# Patient Record
Sex: MALE | Race: WHITE | Employment: OTHER | RURAL
[De-identification: names, ages, dates, MRNs, and addresses within clinical notes are randomized per-mention and may not be internally consistent; named-entity substitution may affect disease eponyms.]

---

## 2017-01-06 ENCOUNTER — TELEPHONE (OUTPATIENT)
Dept: CARDIOLOGY CLINIC | Age: 72
End: 2017-01-06

## 2017-01-06 DIAGNOSIS — I65.23 CAROTID STENOSIS, BILATERAL: ICD-10-CM

## 2017-01-06 NOTE — TELEPHONE ENCOUNTER
----- Message from Tawanda Urbano MD sent at 1/6/2017  2:16 PM EST -----  Regarding: carotid dopplers  Advise carotid dopplers UNCHANGED from previous 50-69% L ICA and 16-49% Right ICA.   Thanks [facility]

## 2017-01-06 NOTE — TELEPHONE ENCOUNTER
----- Message from Deandra Naik MD sent at 1/6/2017 12:47 PM EST -----  Regarding: echo  Call/advise echo UNCHANGED from previous--normal LV pumping function, mild aortic valve calcium, no stenosis, aorta 4.5 cm--no change.   Thanks [facility]

## 2017-01-10 DIAGNOSIS — I77.810 DILATED AORTIC ROOT (HCC): ICD-10-CM

## 2017-01-10 DIAGNOSIS — I48.0 PAROXYSMAL ATRIAL FIBRILLATION (HCC): ICD-10-CM

## 2017-01-10 NOTE — TELEPHONE ENCOUNTER
Spoke with the pt. Verified patient with two patient identifiers. Echo & carotid doppler results given. Questions answered.

## 2017-06-19 RX ORDER — RIVAROXABAN 20 MG/1
TABLET, FILM COATED ORAL
Qty: 90 TAB | Refills: 2 | Status: SHIPPED | OUTPATIENT
Start: 2017-06-19 | End: 2018-03-14 | Stop reason: SDUPTHER

## 2017-06-21 ENCOUNTER — OFFICE VISIT (OUTPATIENT)
Dept: CARDIOLOGY CLINIC | Age: 72
End: 2017-06-21

## 2017-06-21 VITALS
HEART RATE: 48 BPM | BODY MASS INDEX: 19.88 KG/M2 | SYSTOLIC BLOOD PRESSURE: 90 MMHG | WEIGHT: 150 LBS | OXYGEN SATURATION: 98 % | RESPIRATION RATE: 16 BRPM | DIASTOLIC BLOOD PRESSURE: 60 MMHG | HEIGHT: 73 IN

## 2017-06-21 DIAGNOSIS — I77.810 DILATED AORTIC ROOT (HCC): ICD-10-CM

## 2017-06-21 DIAGNOSIS — R00.1 SINUS BRADYCARDIA: Primary | ICD-10-CM

## 2017-06-21 DIAGNOSIS — I65.23 CAROTID STENOSIS, BILATERAL: ICD-10-CM

## 2017-06-21 DIAGNOSIS — I48.0 PAROXYSMAL ATRIAL FIBRILLATION (HCC): ICD-10-CM

## 2017-06-21 NOTE — MR AVS SNAPSHOT
Visit Information Date & Time Provider Department Dept. Phone Encounter #  
 6/21/2017 11:20 AM Oren Vaca MD [facility] [phone] [id number] Follow-up Instructions Return in about 6 months (around 12/21/2017). Follow-up and Disposition History Your Appointments 1/23/2018  3:00 PM  
ESTABLISHED PATIENT with Oren Vaca MD  
[address] [facility] [address] [facility]) Appt Note: 6 mth fu  $0 cp  
 [address] [id number] [phone]  
  
   
 [facility] [address] [address] Upcoming Health Maintenance Date Due Hepatitis C Screening 1945 DTaP/Tdap/Td series (1 - Tdap) 11/17/1968 FOBT Q 1 YEAR AGE 50-75 11/17/1997 ZOSTER VACCINE AGE 60> 11/17/2007 GLAUCOMA SCREENING Q2Y 11/17/2012 Pneumococcal 65+ Low/Medium Risk (1 of 2 - PCV13) 11/17/2012 MEDICARE YEARLY EXAM 11/17/2012 INFLUENZA AGE 9 TO ADULT 8/1/2017 Allergies as of 6/21/2017  Review Complete On: 6/21/2017 By: Oren Vaca MD  
 No Known Allergies Current Immunizations  Never Reviewed No immunizations on file. Not reviewed this visit You Were Diagnosed With   
  
 Codes Comments Sinus bradycardia    -  Primary ICD-10-CM: R00.1 ICD-9-CM: 427.89 Paroxysmal atrial fibrillation (HCC)     ICD-10-CM: I48.0 ICD-9-CM: 427.31 Dilated aortic root (HCC)     ICD-10-CM: X43.477 ICD-9-CM: 447.71 Carotid stenosis, bilateral     ICD-10-CM: I65.23 ICD-9-CM: 433.10, 433.30 Vitals BP Pulse Resp Height(growth percentile) Weight(growth percentile) SpO2  
 90/60 (BP 1 Location: Left arm, BP Patient Position: Sitting) (!) 48 16 6' 1\" (1.854 m) 150 lb (68 kg) 98% BMI Smoking Status 19.79 kg/m2 Former Smoker Vitals History BMI and BSA Data Body Mass Index Body Surface Area  19.79 kg/m 2 1.87 m 2  
  
  
 Preferred Pharmacy Pharmacy Name Phone [address] [facility] [phone] Your Updated Medication List  
  
   
This list is accurate as of: 6/21/17 11:58 AM.  Always use your most recent med list.  
  
  
  
  
 aspirin delayed-release 81 mg tablet Take  by mouth daily. atenolol 25 mg tablet Commonly known as:  TENORMIN Take 0.5 Tabs by mouth nightly. FLONASE 50 mcg/actuation nasal spray Generic drug:  fluticasone  
by Nasal route as needed. magnesium oxide 400 mg tablet Commonly known as:  MAG-OX Take 400 mg by mouth daily. METHIMAZOLE PO Take 2.5 mg by mouth every seven (7) days. VITAMIN B-12 1,000 mcg tablet Generic drug:  cyanocobalamin Take 1,000 mcg by mouth daily. VITAMIN D3 1,000 unit Cap Generic drug:  cholecalciferol Take 2,000 Units by mouth. XARELTO 20 mg Tab tablet Generic drug:  rivaroxaban TAKE 1 TABLET DAILY WITH DINNER We Performed the Following AMB POC EKG ROUTINE W/ 12 LEADS, INTER & REP [10870 CPT(R)] Follow-up Instructions Return in about 6 months (around 12/21/2017). Introducing [facility] & HEALTH SERVICES! [place] introduces [employer] patient portal. Now you can access parts of your medical record, email your doctor's office, and request medication refills online. 1. In your internet browser, go to [email or web address] 2. Click on the First Time User? Click Here link in the Sign In box. You will see the New Member Sign Up page. 3. Enter your [employer] Access Code exactly as it appears below. You will not need to use this code after youve completed the sign-up process. If you do not sign up before the expiration date, you must request a new code. · [employer] Access Code: [email or web address] Expires: 9/19/2017 11:58 AM 
 
4.  Enter the last four digits of your Social Security Number (xxxx) and Date of Birth (mm/dd/yyyy) as indicated and click Submit. You will be taken to the next sign-up page. 5. Create a [employer] ID. This will be your [employer] login ID and cannot be changed, so think of one that is secure and easy to remember. 6. Create a [employer] password. You can change your password at any time. 7. Enter your Password Reset Question and Answer. This can be used at a later time if you forget your password. 8. Enter your e-mail address. You will receive e-mail notification when new information is available in [address] 9. Click Sign Up. You can now view and download portions of your medical record. 10. Click the Download Summary menu link to download a portable copy of your medical information. If you have questions, please visit the Frequently Asked Questions section of the [employer] website. Remember, [employer] is NOT to be used for urgent needs. For medical emergencies, dial 911. Now available from your iPhone and Android! Please provide this summary of care documentation to your next provider. Your primary care clinician is listed as Angel Severin. If you have any questions after today's visit, please call [phone].

## 2017-06-21 NOTE — PROGRESS NOTES
Nataliia Leon is a 73 y.o. male is here for routine f/u. No CV sx or complaints. Continues to eat plant-based diet/vegan, physically active, doing well. Lipids/labs followed by PCP. Last Echo 1/17 \"stable\". No PAF episodes. The patient denies chest pain/ shortness of breath, orthopnea, PND, LE edema, palpitations, syncope, presyncope or fatigue. 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 Social History     Social History    Marital status:      Spouse name: N/A    Number of children: N/A    Years of education: N/A     Occupational History    Not on file. Social History Main Topics    Smoking status: Former Smoker     Types: Cigarettes     Quit date: 6/5/1970    Smokeless tobacco: Not on file    Alcohol use Not on file    Drug use: Not on file    Sexual activity: Not on file     Other Topics Concern    Not on file     Social History Narrative      Current Outpatient Prescriptions   Medication Sig    XARELTO 20 mg tab tablet TAKE 1 TABLET DAILY WITH DINNER    atenolol (TENORMIN) 25 mg tablet Take 0.5 Tabs by mouth nightly.  magnesium oxide (MAG-OX) 400 mg tablet Take 400 mg by mouth daily.  cyanocobalamin (VITAMIN B-12) 1,000 mcg tablet Take 1,000 mcg by mouth daily.  Cholecalciferol, Vitamin D3, (VITAMIN D3) 1,000 unit cap Take 2,000 Units by mouth.  aspirin delayed-release 81 mg tablet Take  by mouth daily.     METHIMAZOLE PO Take 2.5 mg by mouth every seven (7) days.  fluticasone (FLONASE) 50 mcg/Actuation nasal spray by Nasal route as needed. No current facility-administered medications for this visit. Review of Symptoms:    CONST  No weight change. No fever, chills, sweats    ENT No visual changes, URI sx, sore throat    CV  See HPI   RESP  No cough, or sputum, wheezing. Also see HPI   GI  No abdominal pain or change in bowel habits. No heartburn or dysphagia. No melena or rectal bleeding.   No dysuria, urgency, frequency, hematuria   MSKEL  No joint pain, swelling. No muscle pain. SKIN  No rash or lesions. NEURO  No headache, syncope, or seizure. No weakness, loss of sensation, or paresthesias. PSYCH  No low mood or depression  No anxiety. HE/LYMPH  No easy bruising, abnormal bleeding, or enlarged glands. Physical ExamPhysical Exam:    Visit Vitals    BP 90/60 (BP 1 Location: Left arm, BP Patient Position: Sitting)    Pulse (!) 48    Resp 16    Ht 6' 1\" (1.854 m)    Wt 150 lb (68 kg)    SpO2 98%    BMI 19.79 kg/m2     Gen: NAD  HEENT:  PERRL, throat clear  Neck: no mass or thyromegaly, no JVD   Heart:  Regular,Nl S1S2,  no murmur, gallop or rub.   Lungs:  clear  Abdomen:   Soft, non-tender, bowel sounds are active.   Extremities:  No edema  Pulse: symmetric  Neuro: A&O times 3, WNL      Cardiographics    ECG: sinus julissa, no acute changes      Labs:   No results found for: NA, K, CL, CO2, AGAP, GLU, BUN, CREA, BUCR, GFRAA, GFRNA, CA, TBIL, TBILI, GPT, SGOT, AP, TP, ALB, GLOB, AGRAT, ALT  No results found for: CPK, CPKX, CPX  Lab Results   Component Value Date/Time    Cholesterol, total 215 09/24/2012 07:40 AM    HDL Cholesterol 42 09/24/2012 07:40 AM    LDL, calculated 152 09/24/2012 07:40 AM    Triglyceride 107 09/24/2012 07:40 AM     No results found for this or any previous visit.     Assessment:         Patient Active Problem List    Diagnosis Date Noted    Dilated aortic [address] [address] 12/14/2016    Sinus bradycardia 12/14/2016    Carotid stenosis, bilateral     Paroxysmal atrial fibrillation (HCC)     Hyperthyroidism      No CV sx or complaints. Continues to eat plant-based diet/vegan, physically active, doing well. Lipids/labs followed by PCP. Last Echo 1/17 \"stable\". No PAF episodes. Plan:     Doing well with no adverse cardiac symptoms. Lipids and labs followed by PCP. Continue current care and f/u in 6 months.     Dave Alvarado MD

## 2017-08-07 RX ORDER — ATENOLOL 25 MG/1
TABLET ORAL
Qty: 90 TAB | Refills: 1 | Status: SHIPPED | OUTPATIENT
Start: 2017-08-07 | End: 2018-08-14 | Stop reason: SDUPTHER

## 2017-09-27 ENCOUNTER — TELEPHONE (OUTPATIENT)
Dept: CARDIOLOGY CLINIC | Age: 72
End: 2017-09-27

## 2017-09-27 NOTE — TELEPHONE ENCOUNTER
Pt notified (after 2 identifiers) per Dr Rosey Blanchard \"Would be better off to take tylenol (is on Xarelto and ASA 81)--the ibuprofen might increase risk of bleeding\". Pt acknowledged understanding and had no further questions.

## 2017-09-27 NOTE — TELEPHONE ENCOUNTER
Pt states he \"crushed his finger\" day or two ago, and had a root canal yesterday. Inquiring if he can take Ibuprofen with his current medications. Requesting c/b.

## 2018-02-09 ENCOUNTER — OFFICE VISIT (OUTPATIENT)
Dept: CARDIOLOGY CLINIC | Age: 73
End: 2018-02-09

## 2018-02-09 VITALS
BODY MASS INDEX: 20.52 KG/M2 | RESPIRATION RATE: 16 BRPM | OXYGEN SATURATION: 94 % | HEART RATE: 71 BPM | SYSTOLIC BLOOD PRESSURE: 132 MMHG | HEIGHT: 73 IN | DIASTOLIC BLOOD PRESSURE: 68 MMHG | WEIGHT: 154.8 LBS

## 2018-02-09 DIAGNOSIS — R00.1 SINUS BRADYCARDIA: ICD-10-CM

## 2018-02-09 DIAGNOSIS — I48.0 PAROXYSMAL ATRIAL FIBRILLATION (HCC): Primary | ICD-10-CM

## 2018-02-09 DIAGNOSIS — I65.23 CAROTID STENOSIS, BILATERAL: ICD-10-CM

## 2018-02-09 DIAGNOSIS — I77.810 DILATED AORTIC ROOT (HCC): ICD-10-CM

## 2018-02-09 NOTE — MR AVS SNAPSHOT
[address] [id number] [phone] Patient: Sherlie Litten MRN: [id number] OG Visit Information Date & Time Provider Department Dept. Phone Encounter #  
 2018 11:20 AM Shine Burgess MD [facility] [phone] [id number] Follow-up Instructions Return in about 6 months (around 2018). Follow-up and Disposition History Upcoming Health Maintenance Date Due Hepatitis C Screening 1945 DTaP/Tdap/Td series (1 - Tdap) 1968 FOBT Q 1 YEAR AGE 50-75 1997 ZOSTER VACCINE AGE 60> 2007 GLAUCOMA SCREENING Q2Y 2012 Pneumococcal 65+ Low/Medium Risk (1 of 2 - PCV13) 2012 MEDICARE YEARLY EXAM 2012 Influenza Age 5 to Adult 2017 Allergies as of 2018  Review Complete On: 2018 By: Rosanna Broussard LPN No Known Allergies Current Immunizations  Never Reviewed No immunizations on file. Not reviewed this visit You Were Diagnosed With   
  
 Codes Comments Paroxysmal atrial fibrillation (HCC)    -  Primary ICD-10-CM: I48.0 ICD-9-CM: 427.31 Sinus bradycardia     ICD-10-CM: R00.1 ICD-9-CM: 427.89 Dilated aortic root (HCC)     ICD-10-CM: C05.334 ICD-9-CM: 447.71 Carotid stenosis, bilateral     ICD-10-CM: I65.23 ICD-9-CM: 433.10, 433.30 Vitals BP Pulse Resp Height(growth percentile) Weight(growth percentile) SpO2  
 132/68 (BP 1 Location: Left arm, BP Patient Position: Sitting) 71 16 6' 1\" (1.854 m) 154 lb 12.8 oz (70.2 kg) 94% BMI Smoking Status 20.42 kg/m2 Former Smoker Vitals History BMI and BSA Data Body Mass Index Body Surface Area  
 20.42 kg/m 2 1.9 m 2 Preferred Pharmacy Pharmacy Name Phone [address] [facility] [phone] Your Updated Medication List  
  
   
This list is accurate as of: 2/9/18 11:53 AM.  Always use your most recent med list.  
  
  
  
  
 aspirin delayed-release 81 mg tablet Take  by mouth daily. atenolol 25 mg tablet Commonly known as:  TENORMIN  
TAKE ONE-HALF (1/2) TABLET NIGHTLY FLONASE 50 mcg/actuation nasal spray Generic drug:  fluticasone  
by Nasal route as needed. magnesium oxide 400 mg tablet Commonly known as:  MAG-OX Take 400 mg by mouth daily. METHIMAZOLE PO Take 2.5 mg by mouth every seven (7) days. VITAMIN B-12 1,000 mcg tablet Generic drug:  cyanocobalamin Take 1,000 mcg by mouth daily. VITAMIN D3 1,000 unit Cap Generic drug:  cholecalciferol Take 2,000 Units by mouth. XARELTO 20 mg Tab tablet Generic drug:  rivaroxaban TAKE 1 TABLET DAILY WITH DINNER We Performed the Following AMB POC EKG ROUTINE W/ 12 LEADS, INTER & REP [51004 CPT(R)] Follow-up Instructions Return in about 6 months (around 8/9/2018). Introducing \[facility]\"" & HEALTH SERVICES! Tessa Mcclain introduces [employer] patient portal. Now you can access parts of your medical record, email your doctor's office, and request medication refills online. 1. In your internet browser, go to [email or web address] 2. Click on the First Time User? Click Here link in the Sign In box. You will see the New Member Sign Up page. 3. Enter your [employer] Access Code exactly as it appears below. You will not need to use this code after youve completed the sign-up process. If you do not sign up before the expiration date, you must request a new code. · [employer] Access Code: [email or web address] Expires: 5/10/2018 11:53 AM 
 
4. Enter the last four digits of your Social Security Number (xxxx) and Date of Birth (mm/dd/yyyy) as indicated and click Submit. You will be taken to the next sign-up page. 5. Create a [email or web address] ID. This will be your [email or web address] login ID and cannot be changed, so think of one that is secure and easy to remember. 6. Create a [email or web address] password. You can change your password at any time. 7. Enter your Password Reset Question and Answer. This can be used at a later time if you forget your password. 8. Enter your e-mail address. You will receive e-mail notification when new information is available in [address] 9. Click Sign Up. You can now view and download portions of your medical record. 10. Click the Download Summary menu link to download a portable copy of your medical information. If you have questions, please visit the Frequently Asked Questions section of the [email or web address] website. Remember, [email or web address] is NOT to be used for urgent needs. For medical emergencies, dial 911. Now available from your iPhone and Android! Please provide this summary of care documentation to your next provider. Your primary care clinician is listed as Sandie American. If you have any questions after today's visit, please call [phone].

## 2018-02-09 NOTE — PROGRESS NOTES
True Lizz is a 81 y.o. male is here for routine f/u. No CV sx or complaints. Continues to eat plant-based diet/vegan, physically active, doing well. Lipids/labs followed by PCP. Last Echo 1/17 \"stable\". No PAF episodes. The patient denies chest pain/ shortness of breath, orthopnea, PND, LE edema, palpitations, syncope, presyncope or fatigue. 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 Social History Main Topics    Smoking status: Former Smoker     Types: Cigarettes     Quit date: 6/5/1970    Smokeless tobacco: Never Used    Alcohol use Not on file    Drug use: Not on file    Sexual activity: Not on file     Other Topics Concern    Not on file     Social History Narrative      Current Outpatient Prescriptions   Medication Sig    atenolol (TENORMIN) 25 mg tablet TAKE ONE-HALF (1/2) TABLET NIGHTLY    XARELTO 20 mg tab tablet TAKE 1 TABLET DAILY WITH DINNER    magnesium oxide (MAG-OX) 400 mg tablet Take 400 mg by mouth daily.  cyanocobalamin (VITAMIN B-12) 1,000 mcg tablet Take 1,000 mcg by mouth daily.     Cholecalciferol, Vitamin D3, (VITAMIN D3) 1,000 unit cap Take 2,000 Units by mouth.    aspirin delayed-release 81 mg tablet Take  by mouth daily.  fluticasone (FLONASE) 50 mcg/Actuation nasal spray by Nasal route as needed.  METHIMAZOLE PO Take 2.5 mg by mouth every seven (7) days. No current facility-administered medications for this visit. Review of Symptoms:    CONST  No weight change. No fever, chills, sweats    ENT No visual changes, URI sx, sore throat    CV  See HPI   RESP  No cough, or sputum, wheezing. Also see HPI   GI  No abdominal pain or change in bowel habits. No heartburn or dysphagia. No melena or rectal bleeding.   No dysuria, urgency, frequency, hematuria   MSKEL  No joint pain, swelling. No muscle pain. SKIN  No rash or lesions. NEURO  No headache, syncope, or seizure. No weakness, loss of sensation, or paresthesias. PSYCH  No low mood or depression  No anxiety. HE/LYMPH  No easy bruising, abnormal bleeding, or enlarged glands. Physical ExamPhysical Exam:    Visit Vitals    /68 (BP 1 Location: Left arm, BP Patient Position: Sitting)    Pulse 71    Resp 16    Ht 6' 1\" (1.854 m)    Wt 154 lb 12.8 oz (70.2 kg)    SpO2 94%    BMI 20.42 kg/m2     Gen: NAD  HEENT:  PERRL, throat clear  Neck: no adenopathy, no thyromegaly, no JVD   Heart:  Regular,Nl R6P8,  II/VI diastolic murmur, no gallop or rub.   Lungs:  clear  Abdomen:   Soft, non-tender, bowel sounds are active.   Extremities:  No edema  Pulse: symmetric  Neuro: A&O times 3, No focal neuro deficits    Cardiographics    ECG: sinus julissa, no acute changes      Lab Results   Component Value Date/Time    Cholesterol, total 215 (H) 09/24/2012 07:40 AM    HDL Cholesterol 42 09/24/2012 07:40 AM    LDL, calculated 152 (H) 09/24/2012 07:40 AM    Triglyceride 107 09/24/2012 07:40 AM     No results found for this or any previous visit.     Assessment:         Patient Active Problem List    Diagnosis Date Noted    Dilated aortic root ([address] 12/14/2016    Sinus bradycardia 12/14/2016    Carotid stenosis, bilateral     Paroxysmal atrial fibrillation (HCC)     Hyperthyroidism         Plan:     Doing well with no adverse cardiac symptoms. Lipids and labs followed by PCP. Continue current care and f/u in 6 months.     Woody Hawk MD

## 2018-02-09 NOTE — PROGRESS NOTES
Chief Complaint   Patient presents with    Irregular Heart Beat     6 month follow up     1. Have you been to the ER, urgent care clinic since your last visit? Hospitalized since your last visit? No    2. Have you seen or consulted any other health care providers outside of the [address] [place] [address] since your last visit? Include any pap smears or colon screening.  No

## 2018-03-14 RX ORDER — RIVAROXABAN 20 MG/1
TABLET, FILM COATED ORAL
Qty: 90 TAB | Refills: 2 | Status: SHIPPED | OUTPATIENT
Start: 2018-03-14 | End: 2018-11-10 | Stop reason: SDUPTHER

## 2018-08-13 ENCOUNTER — OFFICE VISIT (OUTPATIENT)
Dept: CARDIOLOGY CLINIC | Age: 73
End: 2018-08-13

## 2018-08-13 VITALS
WEIGHT: 148 LBS | OXYGEN SATURATION: 98 % | HEART RATE: 49 BPM | SYSTOLIC BLOOD PRESSURE: 118 MMHG | DIASTOLIC BLOOD PRESSURE: 76 MMHG | BODY MASS INDEX: 19.61 KG/M2 | RESPIRATION RATE: 18 BRPM | HEIGHT: 73 IN

## 2018-08-13 DIAGNOSIS — R00.1 SINUS BRADYCARDIA: ICD-10-CM

## 2018-08-13 DIAGNOSIS — I65.23 CAROTID STENOSIS, BILATERAL: ICD-10-CM

## 2018-08-13 DIAGNOSIS — I77.810 DILATED AORTIC ROOT (HCC): ICD-10-CM

## 2018-08-13 DIAGNOSIS — E05.90 HYPERTHYROIDISM: ICD-10-CM

## 2018-08-13 DIAGNOSIS — I48.0 PAROXYSMAL ATRIAL FIBRILLATION (HCC): Primary | ICD-10-CM

## 2018-08-13 NOTE — PROGRESS NOTES
Verified patient with two patient identifiers. Medications reviewed/approved by Dr. Sunny Queen. Verbal from Dr. Sunny Queen to remove the medications that were deleted during the visit. Medication(s) removed: magnesium 400 mg    Chief Complaint   Patient presents with    Irregular Heart Beat     6 month follow up    Slow Heart Rate    Carotid Artery Stenosis       1. Have you been to the ER, urgent care clinic since your last visit? Hospitalized since your last visit? no    2. Have you seen or consulted any other health care providers outside of the [address] [place] [address] since your last visit? Include any pap smears or colon screening. Yes, Dr. Raymundo Garzon - pcp - routine care.

## 2018-08-13 NOTE — MR AVS SNAPSHOT
[address] [place] [address] 
 
 
 [address] [address] [phone] Patient: Darrell Jaramillo MRN: [id number] Visit Information Date & Time Provider Department Dept. Phone Encounter #  
 8/13/2018 10:40 AM Abdias Larsen, [address] [place] [address] Cardiology [facility]  Follow-up Instructions Return in about 6 months (around 2/13/2019). Follow-up and Disposition History Upcoming Health Maintenance Date Due Hepatitis C Screening 1945 DTaP/Tdap/Td series (1 - Tdap) 11/17/1968 FOBT Q 1 YEAR AGE 50-75 11/17/1997 ZOSTER VACCINE AGE 60> 9/17/2007 GLAUCOMA SCREENING Q2Y 11/17/2012 Pneumococcal 65+ Low/Medium Risk (1 of 2 - PCV13) 11/17/2012 MEDICARE YEARLY EXAM 3/14/2018 Influenza Age 5 to Adult 8/1/2018 Allergies as of 8/13/2018  Review Complete On: 8/13/2018 By: Abdias Larsen MD  
 No Known Allergies Current Immunizations  Never Reviewed No immunizations on file. Not reviewed this visit You Were Diagnosed With   
  
 Codes Comments Paroxysmal atrial fibrillation (HCC)    -  Primary ICD-10-CM: I48.0 ICD-9-CM: 427.31 Sinus bradycardia     ICD-10-CM: R00.1 ICD-9-CM: 427.89 Carotid stenosis, bilateral     ICD-10-CM: I65.23 ICD-9-CM: 433.10, 433.30 Dilated aortic root (HCC)     ICD-10-CM: F61.069 ICD-9-CM: 447.71 Hyperthyroidism     ICD-10-CM: E05.90 ICD-9-CM: 242.90 Vitals BP Pulse Resp Height(growth percentile) Weight(growth percentile) SpO2  
 118/76 (BP 1 Location: Left arm, BP Patient Position: Sitting) (!) 49 18 6' 1\" (1.854 m) 148 lb (67.1 kg) 98% BMI Smoking Status 19.53 kg/m2 Former Smoker Vitals History BMI and BSA Data Body Mass Index Body Surface Area  
 19.53 kg/m 2 1.86 m 2 Preferred Pharmacy Pharmacy Name Phone [facility], [facility] [phone] Your Updated Medication List  
  
   
This list is accurate as of 8/13/18 11:33 AM.  Always use your most recent med list.  
  
  
  
  
 aspirin delayed-release 81 mg tablet Take  by mouth daily. atenolol 25 mg tablet Commonly known as:  TENORMIN  
TAKE ONE-HALF (1/2) TABLET NIGHTLY CALCIUM-MAGNESIUM-ZINC PO Take  by mouth. FLONASE 50 mcg/actuation nasal spray Generic drug:  fluticasone  
by Nasal route as needed. METHIMAZOLE PO Take 2.5 mg by mouth every seven (7) days. VITAMIN B-12 1,000 mcg tablet Generic drug:  cyanocobalamin Take 1,000 mcg by mouth daily. VITAMIN D3 1,000 unit Cap Generic drug:  cholecalciferol Take 2,000 Units by mouth. XARELTO 20 mg Tab tablet Generic drug:  rivaroxaban TAKE 1 TABLET DAILY WITH DINNER We Performed the Following AMB POC EKG ROUTINE W/ 12 LEADS, INTER & REP [86504 CPT(R)] Follow-up Instructions Return in about 6 months (around 2/13/2019). To-Do List   
 Around 01/07/2019 ECHO:  2D ECHO COMPLETE ADULT (TTE) W OR WO CONTR Around 01/07/2019 Imaging:  DUPLEX CAROTID BILATERAL Introducing [facility]! Macario Velasquez introduces [employer] patient portal. Now you can access parts of your medical record, email your doctor's office, and request medication refills online. 1. In your internet browser, go to [email or web address] 2. Click on the First Time User? Click Here link in the Sign In box. You will see the New Member Sign Up page. 3. Enter your [employer] Access Code exactly as it appears below. You will not need to use this code after youve completed the sign-up process. If you do not sign up before the expiration date, you must request a new code. · [employer] Access Code: [email or web address] Expires: 11/11/2018 10:22 AM 
 
 4. Enter the last four digits of your Social Security Number (xxxx) and Date of Birth (mm/dd/yyyy) as indicated and click Submit. You will be taken to the next sign-up page. 5. Create a [employer] ID. This will be your [employer] login ID and cannot be changed, so think of one that is secure and easy to remember. 6. Create a [employer] password. You can change your password at any time. 7. Enter your Password Reset Question and Answer. This can be used at a later time if you forget your password. 8. Enter your e-mail address. You will receive e-mail notification when new information is available in [address] 9. Click Sign Up. You can now view and download portions of your medical record. 10. Click the Download Summary menu link to download a portable copy of your medical information. If you have questions, please visit the Frequently Asked Questions section of the [employer] website. Remember, [employer] is NOT to be used for urgent needs. For medical emergencies, dial 911. Now available from your iPhone and Android! Please provide this summary of care documentation to your next provider. Your primary care clinician is listed as Tom Zavala. If you have any questions after today's visit, please call [phone].

## 2018-08-13 NOTE — PROGRESS NOTES
Margarito Odonnell is a 81 y.o. male is here for routine f/u.  No CV sx or complaints.  Continues to eat plant-based diet/vegan, physically active, doing well. Lipids/labs followed by PCP. Bekah Gilbert Echo 1/17 \"stable\". No PAF episodes. The patient denies chest pain/ shortness of breath, orthopnea, PND, LE edema, palpitations, syncope, presyncope or fatigue. 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 Social History Main Topics    Smoking status: Former Smoker     Types: Cigarettes     Quit date: 6/5/1970    Smokeless tobacco: Never Used    Alcohol use Not on file    Drug use: Not on file    Sexual activity: Not on file     Other Topics Concern    Not on file     Social History Narrative      Current Outpatient Prescriptions   Medication Sig    XARELTO 20 mg tab tablet TAKE 1 TABLET DAILY WITH DINNER    atenolol (TENORMIN) 25 mg tablet TAKE ONE-HALF (1/2) TABLET NIGHTLY    magnesium oxide (MAG-OX) 400 mg tablet Take 400 mg by mouth daily.  cyanocobalamin (VITAMIN B-12) 1,000 mcg tablet Take 1,000 mcg by mouth daily.     Cholecalciferol, Vitamin D3, (VITAMIN D3) 1,000 unit cap Take 2,000 Units by mouth.    aspirin delayed-release 81 mg tablet Take  by mouth daily.  fluticasone (FLONASE) 50 mcg/Actuation nasal spray by Nasal route as needed.  METHIMAZOLE PO Take 2.5 mg by mouth every seven (7) days. No current facility-administered medications for this visit. Review of Symptoms:    CONST  No weight change. No fever, chills, sweats    ENT No visual changes, URI sx, sore throat    CV  See HPI   RESP  No cough, or sputum, wheezing. Also see HPI   GI  No abdominal pain or change in bowel habits. No heartburn or dysphagia. No melena or rectal bleeding.   No dysuria, urgency, frequency, hematuria   MSKEL  No joint pain, swelling. No muscle pain. SKIN  No rash or lesions. NEURO  No headache, syncope, or seizure. No weakness, loss of sensation, or paresthesias. PSYCH  No low mood or depression  No anxiety. HE/LYMPH  No easy bruising, abnormal bleeding, or enlarged glands. Physical ExamPhysical Exam:    Visit Vitals    Pulse (!) 49    Resp 18    Ht 6' 1\" (1.854 m)    SpO2 98%     Gen: NAD  HEENT:  PERRL, throat clear  Neck: no adenopathy, no thyromegaly, no JVD faint bruit L  Heart:  Regular,Nl S1S2,  I-II/VI diastolic murmur, no gallop or rub.   Lungs:  clear  Abdomen:   Soft, non-tender, bowel sounds are active.   Extremities:  No edema  Pulse: symmetric  Neuro: A&O times 3, No focal neuro deficits    Cardiographics    ECG: sinus julissa, wnl      Assessment:         Patient Active Problem List    Diagnosis Date Noted    Dilated aortic root (HCC) 12/14/2016    Sinus bradycardia 12/14/2016    Carotid stenosis, bilateral     Paroxysmal atrial fibrillation (HCC)     Hyperthyroidism      No CV sx or complaints.  Continues to eat plant-based diet/vegan, physically active, doing well. Lipids/labs followed by PCP. Elijah Thomson Echo 1/17 \"stable\". No PAF episodes. Plan:     Doing well with no adverse cardiac symptoms.    Plan repeat Echo/doppler and Carotid dopplers 1/19 (2 yrs)   Lipids and labs followed by PCP. Continue current care and f/u in 6 months.     Edel Shukla MD

## 2018-08-14 RX ORDER — ATENOLOL 25 MG/1
TABLET ORAL
Qty: 90 TAB | Refills: 1 | Status: SHIPPED | OUTPATIENT
Start: 2018-08-14 | End: 2019-01-15

## 2018-08-21 ENCOUNTER — CLINICAL SUPPORT (OUTPATIENT)
Dept: CARDIOLOGY CLINIC | Age: 73
End: 2018-08-21

## 2018-08-21 ENCOUNTER — TELEPHONE (OUTPATIENT)
Dept: CARDIOLOGY CLINIC | Age: 73
End: 2018-08-21

## 2018-08-21 VITALS
OXYGEN SATURATION: 98 % | RESPIRATION RATE: 16 BRPM | HEART RATE: 76 BPM | SYSTOLIC BLOOD PRESSURE: 120 MMHG | DIASTOLIC BLOOD PRESSURE: 78 MMHG

## 2018-08-21 DIAGNOSIS — E05.90 HYPERTHYROIDISM: ICD-10-CM

## 2018-08-21 DIAGNOSIS — I48.0 PAROXYSMAL ATRIAL FIBRILLATION (HCC): Primary | ICD-10-CM

## 2018-08-21 DIAGNOSIS — I77.810 DILATED AORTIC ROOT (HCC): ICD-10-CM

## 2018-08-21 NOTE — PROGRESS NOTES
Andres Garcia is a 81 y.o. male is here for  Symptom-based f/u--afib/palpitations, mild dizziness, decreased energy past 24 hrs w/ irreg heart rhythm. Continues his Xarelto and low dose atenolol qhs. .  The patient denies chest pain/ shortness of breath, orthopnea, PND, LE edema, , syncope, presyncope or fatigue. 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 Social History Main Topics    Smoking status: Former Smoker     Types: Cigarettes     Quit date: 6/5/1970    Smokeless tobacco: Never Used    Alcohol use Not on file    Drug use: Not on file    Sexual activity: Not on file     Other Topics Concern    Not on file     Social History Narrative      Current Outpatient Prescriptions   Medication Sig    atenolol (TENORMIN) 25 mg tablet TAKE ONE-HALF (1/2) TABLET NIGHTLY    CALCIUM-MAGNESIUM-ZINC PO Take  by mouth.  XARELTO 20 mg tab tablet TAKE 1 TABLET DAILY WITH DINNER    cyanocobalamin (VITAMIN B-12) 1,000 mcg tablet Take 1,000 mcg by mouth daily.  Cholecalciferol, Vitamin D3, (VITAMIN D3) 1,000 unit cap Take 2,000 Units by mouth.     aspirin delayed-release 81 mg tablet Take  by mouth daily.    fluticasone (FLONASE) 50 mcg/Actuation nasal spray by Nasal route as needed.  METHIMAZOLE PO Take 2.5 mg by mouth every seven (7) days. No current facility-administered medications for this visit. Review of Symptoms:    CONST  No weight change. No fever, chills, sweats    ENT No visual changes, URI sx, sore throat    CV  See HPI   RESP  No cough, or sputum, wheezing. Also see HPI   GI  No abdominal pain or change in bowel habits. No heartburn or dysphagia. No melena or rectal bleeding.   No dysuria, urgency, frequency, hematuria   MSKEL  No joint pain, swelling. No muscle pain. SKIN  No rash or lesions. NEURO  No headache, syncope, or seizure. No weakness, loss of sensation, or paresthesias. PSYCH  No low mood or depression  No anxiety. HE/LYMPH  No easy bruising, abnormal bleeding, or enlarged glands. Physical ExamPhysical Exam:    Visit Vitals    /78 (BP 1 Location: Left arm, BP Patient Position: Sitting)    Pulse 76    Resp 16    SpO2 98%  Comment: ra     Gen: NAD  HEENT:  PERRL, throat clear  Neck: no adenopathy, no thyromegaly, no JVD   Heart:  irregular,Nl S1S2,  no murmur, gallop or rub.   Lungs:  clear  Abdomen:   Soft, non-tender, bowel sounds are active.   Extremities:  No edema  Pulse: symmetric  Neuro: A&O times 3, No focal neuro deficits    Cardiographics    ECG: afib       Labs:   No results found for: NA, K, CL, CO2, AGAP, GLU, BUN, CREA, BUCR, GFRAA, GFRNA, CA, TBIL, TBILI, GPT, SGOT, AP, TP, ALB, GLOB, AGRAT, ALT  No results found for: CPK, CPKX, CPX  Lab Results   Component Value Date/Time    Cholesterol, total 215 (H) 09/24/2012 07:40 AM    HDL Cholesterol 42 09/24/2012 07:40 AM    LDL, calculated 152 (H) 09/24/2012 07:40 AM    Triglyceride 107 09/24/2012 07:40 AM     No results found for this or any previous visit.     Assessment:         Patient Active Problem List    Diagnosis Date Noted    Dilated aortic root ([facility] [address] 12/14/2016    Sinus bradycardia 12/14/2016    Carotid stenosis, bilateral     Paroxysmal atrial fibrillation (HCC)     Hyperthyroidism      Symptom-based f/u--afib/palpitations, mild dizziness, decreased energy past 24 hrs w/ irreg heart rhythm. Continues his Xarelto and low dose atenolol qhs. .     Plan:     Take extra atenolol dose now, fluids, no strenous activity  Will continue his regular dose of atenolol tonight and Xarelto--call in am tomorrow with update  Possible Holter  Would consider flecainide or rythmol but hold off for now      Colleen Kirk MD

## 2018-08-21 NOTE — TELEPHONE ENCOUNTER
Verified patient with two patient identifiers. Pt reports that he's in afib and his rate is all over the place. Pt does not have any way of taking his VS.  Pt is willing to be brought to the office for me to perform a ekg and obtain VS.     No sx's other than feeling anxious. Pt to arrive around 11:30am this morning.

## 2018-08-21 NOTE — TELEPHONE ENCOUNTER
Pt states began with a-fib last night around 6 pm that has continued. Pt was asked for rate and told that \"it's all over the place\". Requesting c/b.

## 2018-08-21 NOTE — PROGRESS NOTES
Verified patient with two patient identifiers. Vital signs:    /78  HR   O2 sat 98% sat  resp 16    No complaints per the pt other than feeling anxious. Medications reviewed/approved by Dr. Constance Beltran. Chief Complaint   Patient presents with    Irregular Heart Beat     Vital sign check - ekg        1. Have you been to the ER, urgent care clinic since your last visit? Hospitalized since your last visit? no    2. Have you seen or consulted any other health care providers outside of the [address] [place] [address] since your last visit? Include any pap smears or colon screening.  no

## 2018-08-21 NOTE — PATIENT INSTRUCTIONS
Take 1/2 of your atenolol once you get home this afternoon. Take your normal dose of atenolol tonight.   Call first thing tomorrow morning, 8:30am.

## 2018-08-21 NOTE — MR AVS SNAPSHOT
[address] 
 
 
 [address] [facility] [id number] [phone] Patient: Jose Wagner MRN: [id number] Visit Information Date & Time Provider Department Dept. Phone Encounter #  
 8/21/2018 11:40 AM Saravanan Dexter, [address] [facility] Cardiology [facility] [phone] [id number] Your Appointments 2/15/2019 10:20 AM  
ESTABLISHED PATIENT with Saravanan Dexter MD  
[address] [place] [facility] [address] [facility]) Appt Note: 6 mo fu $0cp [address] [facility] [address] [address] [phone]  
  
   
 [address] [place] [address] Upcoming Health Maintenance Date Due Hepatitis C Screening 1945 DTaP/Tdap/Td series (1 - Tdap) 11/17/1968 FOBT Q 1 YEAR AGE 50-75 11/17/1997 ZOSTER VACCINE AGE 60> 9/17/2007 GLAUCOMA SCREENING Q2Y 11/17/2012 Pneumococcal 65+ Low/Medium Risk (1 of 2 - PCV13) 11/17/2012 MEDICARE YEARLY EXAM 3/14/2018 Influenza Age 5 to Adult 8/1/2018 Allergies as of 8/21/2018  Review Complete On: 8/13/2018 By: Saravanan Dexter MD  
 No Known Allergies Current Immunizations  Never Reviewed No immunizations on file. Not reviewed this visit You Were Diagnosed With   
  
 Codes Comments Paroxysmal atrial fibrillation (HCC)    -  Primary ICD-10-CM: I48.0 ICD-9-CM: 427.31 Hyperthyroidism     ICD-10-CM: E05.90 ICD-9-CM: 242.90 Dilated aortic root (HCC)     ICD-10-CM: H42.577 ICD-9-CM: 447.71 Vitals BP Pulse Resp SpO2 Smoking Status 120/78 (BP 1 Location: Left arm, BP Patient Position: Sitting) 76 16 98% Former Smoker Vitals History Preferred Pharmacy Pharmacy Name Phone [facility], [facility] [phone] Your Updated Medication List  
  
   
 This list is accurate as of 8/21/18 12:13 PM.  Always use your most recent med list.  
  
  
  
  
 aspirin delayed-release 81 mg tablet Take  by mouth daily. atenolol 25 mg tablet Commonly known as:  TENORMIN  
TAKE ONE-HALF (1/2) TABLET NIGHTLY CALCIUM-MAGNESIUM-ZINC PO Take  by mouth. FLONASE 50 mcg/actuation nasal spray Generic drug:  fluticasone  
by Nasal route as needed. METHIMAZOLE PO Take 2.5 mg by mouth every seven (7) days. VITAMIN B-12 1,000 mcg tablet Generic drug:  cyanocobalamin Take 1,000 mcg by mouth daily. VITAMIN D3 1,000 unit Cap Generic drug:  cholecalciferol Take 2,000 Units by mouth. XARELTO 20 mg Tab tablet Generic drug:  rivaroxaban TAKE 1 TABLET DAILY WITH DINNER We Performed the Following AMB POC EKG ROUTINE W/ 12 LEADS, INTER & REP [16114 CPT(R)] Patient Instructions Take 1/2 of your atenolol once you get home this afternoon. Take your normal dose of atenolol tonight. Call first thing tomorrow morning, 8:30am. 
 
 
 
 
  
Introducing [facility]! [facility] introduces [employer] patient portal. Now you can access parts of your medical record, email your doctor's office, and request medication refills online. 1. In your internet browser, go to [email or web address] 2. Click on the First Time User? Click Here link in the Sign In box. You will see the New Member Sign Up page. 3. Enter your [employer] Access Code exactly as it appears below. You will not need to use this code after youve completed the sign-up process. If you do not sign up before the expiration date, you must request a new code. · [employer] Access Code: [email or web address] Expires: 11/11/2018 10:22 AM 
 
4. Enter the last four digits of your Social Security Number (xxxx) and Date of Birth (mm/dd/yyyy) as indicated and click Submit. You will be taken to the next sign-up page. 5. Create a [employer] ID. This will be your [employer] login ID and cannot be changed, so think of one that is secure and easy to remember. 6. Create a [employer] password. You can change your password at any time. 7. Enter your Password Reset Question and Answer. This can be used at a later time if you forget your password. 8. Enter your e-mail address. You will receive e-mail notification when new information is available in [address] 9. Click Sign Up. You can now view and download portions of your medical record. 10. Click the Download Summary menu link to download a portable copy of your medical information. If you have questions, please visit the Frequently Asked Questions section of the [employer] website. Remember, [employer] is NOT to be used for urgent needs. For medical emergencies, dial 911. Now available from your iPhone and Android! Please provide this summary of care documentation to your next provider. Your primary care clinician is listed as Nicole Ely. If you have any questions after today's visit, please call [phone].

## 2018-08-22 PROBLEM — R55 SYNCOPE: Status: ACTIVE | Noted: 2018-08-22

## 2018-08-22 PROBLEM — I48.92 ATRIAL FLUTTER (HCC): Status: ACTIVE | Noted: 2018-08-22

## 2018-08-23 PROBLEM — I48.92 ATRIAL FLUTTER (HCC): Status: RESOLVED | Noted: 2018-08-22 | Resolved: 2018-08-23

## 2018-08-28 ENCOUNTER — OFFICE VISIT (OUTPATIENT)
Dept: CARDIOLOGY CLINIC | Age: 73
End: 2018-08-28

## 2018-08-28 VITALS
BODY MASS INDEX: 19.91 KG/M2 | HEART RATE: 54 BPM | WEIGHT: 147 LBS | OXYGEN SATURATION: 99 % | DIASTOLIC BLOOD PRESSURE: 78 MMHG | RESPIRATION RATE: 16 BRPM | SYSTOLIC BLOOD PRESSURE: 120 MMHG | HEIGHT: 72 IN

## 2018-08-28 DIAGNOSIS — I48.91 ATRIAL FIBRILLATION STATUS POST CARDIOVERSION (HCC): ICD-10-CM

## 2018-08-28 DIAGNOSIS — I65.23 CAROTID STENOSIS, BILATERAL: ICD-10-CM

## 2018-08-28 DIAGNOSIS — I48.0 PAROXYSMAL ATRIAL FIBRILLATION (HCC): Primary | ICD-10-CM

## 2018-08-28 DIAGNOSIS — R00.1 SINUS BRADYCARDIA: ICD-10-CM

## 2018-08-28 DIAGNOSIS — E05.90 HYPERTHYROIDISM: ICD-10-CM

## 2018-08-28 DIAGNOSIS — I77.810 DILATED AORTIC ROOT (HCC): ICD-10-CM

## 2018-08-28 NOTE — PROGRESS NOTES
Verified patient with two patient identifiers. Medications reviewed/approved by Dr. Winsome Kim. Chief Complaint   Patient presents with    Irregular Heart Beat     1. Have you been to the ER, urgent care clinic since your last visit? Hospitalized since your last visit? Yes, [facility] er 8/2018 for afib, syncope  2. Have you seen or consulted any other health care providers outside of the [facility] since your last visit? Include any pap smears or colon screening.  no

## 2018-08-28 NOTE — PROGRESS NOTES
Andres Garcia is a 81 y.o. male is here for hospital f/u--at [facility] last week with PAF/RVR, syncope--s/p [facility] with restoration of NSR. Remains on anticoag with Xarelto 20mg, and low dose atenolol. Remains in NSR, no recurrent sx since. .  The patient denies chest pain/ shortness of breath, orthopnea, PND, LE edema, palpitations, syncope, presyncope or fatigue. 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)       Past Surgical History:   Procedure Laterality Date    ECHO 2D ADULT  8/2011    EF 60%, Trace AI, PA 41     No Known Allergies   Family History   Problem Relation Age of Onset    No Known Problems Mother     No Known Problems Father       Social History     Social History    Marital status:      Spouse name: N/A    Number of children: N/A    Years of education: N/A     Occupational History    Not on file. Social History Main Topics    Smoking status: Former Smoker     Types: Cigarettes     Quit date: 6/5/1970    Smokeless tobacco: Never Used    Alcohol use Not on file    Drug use: Not on file    Sexual activity: Not on file     Other Topics Concern    Not on file     Social History Narrative      Current Outpatient Prescriptions   Medication Sig    multivitamin (ONE A DAY) tablet Take 3 Tabs by mouth daily. Indications: Calcium 1000, vit D 600u, Zinc 15, Mag 400    methIMAzole (TAPAZOLE) 5 mg tablet Take 2.5 mg by mouth every seven (7) days.  Indications: Monday Only    atenolol (TENORMIN) 25 mg tablet TAKE ONE-HALF (1/2) TABLET NIGHTLY    XARELTO 20 mg tab tablet TAKE 1 TABLET DAILY WITH DINNER    cyanocobalamin (VITAMIN B-12) 1,000 mcg tablet Take 1,000 mcg by mouth daily. Indications: nightly    Cholecalciferol, Vitamin D3, (VITAMIN D3) 1,000 unit cap Take 2,000 Units by mouth.  aspirin delayed-release 81 mg tablet Take  by mouth daily.  fluticasone (FLONASE) 50 mcg/Actuation nasal spray by Nasal route as needed. No current facility-administered medications for this visit. Review of Symptoms:    CONST  No weight change. No fever, chills, sweats    ENT No visual changes, URI sx, sore throat    CV  See HPI   RESP  No cough, or sputum, wheezing. Also see HPI   GI  No abdominal pain or change in bowel habits. No heartburn or dysphagia. No melena or rectal bleeding.   No dysuria, urgency, frequency, hematuria   MSKEL  No joint pain, swelling. No muscle pain. SKIN  No rash or lesions. NEURO  No headache, syncope, or seizure. No weakness, loss of sensation, or paresthesias. PSYCH  No low mood or depression  No anxiety. HE/LYMPH  No easy bruising, abnormal bleeding, or enlarged glands.         Physical ExamPhysical Exam:    Visit Vitals    /78 (BP 1 Location: Left arm, BP Patient Position: Sitting)    Pulse (!) 54    Resp 16    Ht 6' (1.829 m)    Wt 147 lb (66.7 kg)    SpO2 99%  Comment: ra    BMI 19.94 kg/m2     Gen: NAD  HEENT:  PERRL, throat clear  Neck: no adenopathy, no thyromegaly, no JVD   Heart:  Regular,Nl S1S2,  nI/VI murmur, no gallop or rub.   Lungs:  clear  Abdomen:   Soft, non-tender, bowel sounds are active.   Extremities:  No edema  Pulse: symmetric  Neuro: A&O times 3, No focal neuro deficits    Cardiographics    ECG: sinus julissa 57, wnl      Labs:   Lab Results   Component Value Date/Time    Sodium 140 08/23/2018 06:13 AM    Sodium 141 08/22/2018 07:33 AM    Potassium 3.8 08/23/2018 06:13 AM    Potassium 3.9 08/22/2018 07:33 AM    Chloride 106 08/23/2018 06:13 AM    Chloride 103 08/22/2018 07:33 AM    CO2 29 08/23/2018 06:13 AM    CO2 33 (H) 08/22/2018 07:33 AM    Anion gap 5 08/23/2018 06:13 AM    Anion gap 5 08/22/2018 07:33 AM    Glucose 96 08/23/2018 06:13 AM    Glucose 128 (H) 08/22/2018 07:33 AM    BUN 6 08/23/2018 06:13 AM    BUN 14 08/22/2018 07:33 AM    Creatinine 0.63 (L) 08/23/2018 06:13 AM    Creatinine 0.79 08/22/2018 07:33 AM    BUN/Creatinine ratio 10 (L) 08/23/2018 06:13 AM    BUN/Creatinine ratio 18 08/22/2018 07:33 AM    GFR est AA >60 08/23/2018 06:13 AM    GFR est AA >60 08/22/2018 07:33 AM    GFR est non-AA >60 08/23/2018 06:13 AM    GFR est non-AA >60 08/22/2018 07:33 AM    Calcium 8.6 08/23/2018 06:13 AM    Calcium 8.9 08/22/2018 07:33 AM    Bilirubin, total 1.4 (H) 08/22/2018 07:33 AM    AST (SGOT) 18 08/22/2018 07:33 AM    Alk. phosphatase 61 08/22/2018 07:33 AM    Protein, total 7.2 08/22/2018 07:33 AM    Albumin 3.8 08/22/2018 07:33 AM    Globulin 3.4 08/22/2018 07:33 AM    A-G Ratio 1.1 08/22/2018 07:33 AM    ALT (SGPT) 21 08/22/2018 07:33 AM     Lab Results   Component Value Date/Time    CK 47 08/22/2018 07:33 AM     Lab Results   Component Value Date/Time    Cholesterol, total 215 (H) 09/24/2012 07:40 AM    HDL Cholesterol 42 09/24/2012 07:40 AM    LDL, calculated 152 (H) 09/24/2012 07:40 AM    Triglyceride 107 09/24/2012 07:40 AM     No results found for this or any previous visit. Assessment:         Patient Active Problem List    Diagnosis Date Noted    Syncope 08/22/2018    Dilated aortic root ([address] 12/14/2016    Sinus bradycardia 12/14/2016    Carotid stenosis, bilateral     Paroxysmal atrial fibrillation (HCC)     Hyperthyroidism      At [facility] last week with PAF/RVR, syncope--s/p [facility] with restoration of NSR. Remains on anticoag with Xarelto 20mg, and low dose atenolol. Remains in NSR, no recurrent sx since. Echo, carotids, and CT scan as noted.      Plan:     Stable currently and back in sinus rhythm  Will refer to Dr. Alex Francois to discuss options including RFA/PVI vs AAD  Continue Xarelto and low dose beta blocker for now    Bryan Iniguez MD

## 2018-08-28 NOTE — MR AVS SNAPSHOT
[address] 
 
 
 [address] [facility] [address] [id number] [phone] Patient: Alena Wolf MRN: [id number] Visit Information Date & Time Provider Department Dept. Phone Encounter #  
 8/28/2018  9:40 AM [facility]a Arita [facility] [place] [facility] [phone] Your Appointments 2/15/2019 10:20 AM  
ESTABLISHED PATIENT with Krista Arita MD  
[address] [facility] [address] - [facility]) Appt Note: 6 mo fu $0cp [address] [facility] [address] [id number] [phone]  
  
   
 [address] [place], [place] [id number] Upcoming Health Maintenance Date Due Hepatitis C Screening 1945 DTaP/Tdap/Td series (1 - Tdap) 11/17/1968 FOBT Q 1 YEAR AGE 50-75 11/17/1997 ZOSTER VACCINE AGE 60> 9/17/2007 GLAUCOMA SCREENING Q2Y 11/17/2012 Pneumococcal 65+ Low/Medium Risk (1 of 2 - PCV13) 11/17/2012 MEDICARE YEARLY EXAM 3/14/2018 Influenza Age 5 to Adult 8/1/2018 Allergies as of 8/28/2018  Review Complete On: 8/28/2018 By: Suzi Springer LPN No Known Allergies Current Immunizations  Never Reviewed No immunizations on file. Not reviewed this visit You Were Diagnosed With   
  
 Codes Comments Paroxysmal atrial fibrillation (HCC)    -  Primary ICD-10-CM: I48.0 ICD-9-CM: 427.31 Sinus bradycardia     ICD-10-CM: R00.1 ICD-9-CM: 427.89 Atrial fibrillation status post cardioversion [facility])     ICD-10-CM: I48.91 
ICD-9-CM: 427.31 Dilated aortic root (HCC)     ICD-10-CM: B54.393 ICD-9-CM: 447.71 Hyperthyroidism     ICD-10-CM: E05.90 ICD-9-CM: 242.90 Carotid stenosis, bilateral     ICD-10-CM: I65.23 ICD-9-CM: 433.10, 433.30 Vitals  BP Pulse Resp Height(growth percentile) Weight(growth percentile) SpO2  
 120/78 (BP 1 Location: Left arm, BP Patient Position: Sitting) (!) 54 16 6' (1.829 m) 147 lb (66.7 kg) 99% BMI Smoking Status 19.94 kg/m2 Former Smoker Vitals History BMI and BSA Data Body Mass Index Body Surface Area 19.94 kg/m 2 1.84 m 2 Preferred Pharmacy Pharmacy Name Phone [facility], [place] [phone] Your Updated Medication List  
  
   
This list is accurate as of 8/28/18  9:51 AM.  Always use your most recent med list.  
  
  
  
  
 aspirin delayed-release 81 mg tablet Take  by mouth daily. atenolol 25 mg tablet Commonly known as:  TENORMIN  
TAKE ONE-HALF (1/2) TABLET NIGHTLY FLONASE 50 mcg/actuation nasal spray Generic drug:  fluticasone  
by Nasal route as needed. methIMAzole 5 mg tablet Commonly known as:  TAPAZOLE Take 2.5 mg by mouth every seven (7) days. Indications: Monday Only  
  
 multivitamin tablet Commonly known as:  ONE A DAY Take 3 Tabs by mouth daily. Indications: Calcium 1000, vit D 600u, Zinc 15, Mag 400 VITAMIN B-12 1,000 mcg tablet Generic drug:  cyanocobalamin Take 1,000 mcg by mouth daily. Indications: nightly VITAMIN D3 1,000 unit Cap Generic drug:  cholecalciferol Take 2,000 Units by mouth. XARELTO 20 mg Tab tablet Generic drug:  rivaroxaban TAKE 1 TABLET DAILY WITH DINNER We Performed the Following AMB POC EKG ROUTINE W/ 12 LEADS, INTER & REP [74871 CPT(R)] Introducing [facility]! [facility] introduces "[employer] patient portal. Now you can access parts of your medical record, email your doctor's office, and request medication refills online. 1. In your internet browser, go to [email or web address] 2. Click on the First Time User? Click Here link in the Sign In box. You will see the New Member Sign Up page. 3. Enter your "[employer] Access Code exactly as it appears below.  You will not need to use this code after youve completed the sign-up process. If you do not sign up before the expiration date, you must request a new code. · [employer] Access Code: [email or web address] Expires: 11/11/2018 10:22 AM 
 
4. Enter the last four digits of your Social Security Number (xxxx) and Date of Birth (mm/dd/yyyy) as indicated and click Submit. You will be taken to the next sign-up page. 5. Create a [employer] ID. This will be your [employer]ita login ID and cannot be changed, so think of one that is secure and easy to remember. 6. Create a [employer]ita password. You can change your password at any time. 7. Enter your Password Reset Question and Answer. This can be used at a later time if you forget your password. 8. Enter your e-mail address. You will receive e-mail notification when new information is available in [address] 9. Click Sign Up. You can now view and download portions of your medical record. 10. Click the Download Summary menu link to download a portable copy of your medical information. If you have questions, please visit the Frequently Asked Questions section of the [employer] website. Remember, [employer] is NOT to be used for urgent needs. For medical emergencies, dial 911. Now available from your iPhone and Android! Please provide this summary of care documentation to your next provider. Your primary care clinician is listed as Brooke Godinez. If you have any questions after today's visit, please call [phone].

## 2018-09-11 ENCOUNTER — OFFICE VISIT (OUTPATIENT)
Dept: CARDIOLOGY CLINIC | Age: 73
End: 2018-09-11

## 2018-09-11 VITALS
RESPIRATION RATE: 16 BRPM | DIASTOLIC BLOOD PRESSURE: 60 MMHG | HEIGHT: 72 IN | HEART RATE: 60 BPM | BODY MASS INDEX: 20.15 KG/M2 | OXYGEN SATURATION: 98 % | WEIGHT: 148.8 LBS | SYSTOLIC BLOOD PRESSURE: 130 MMHG

## 2018-09-11 DIAGNOSIS — E05.90 HYPERTHYROIDISM: ICD-10-CM

## 2018-09-11 DIAGNOSIS — I48.0 PAROXYSMAL ATRIAL FIBRILLATION (HCC): Primary | ICD-10-CM

## 2018-09-11 DIAGNOSIS — R00.1 SINUS BRADYCARDIA: ICD-10-CM

## 2018-09-11 NOTE — MR AVS SNAPSHOT
[address] 
[phone] Patient: Meredith Krueger MRN: [id number] Visit Information Date & Time Provider Department Dept. Phone Encounter #  
 9/11/2018  1:30 PM Ramiro Jyoti Camarillo, [address] [facility] [phone] [id number] Follow-up Instructions Return in about 4 weeks (around 10/9/2018). Follow-up and Disposition History Your Appointments 2/15/2019 10:20 AM  
ESTABLISHED PATIENT with Aileen Shaffer MD  
[address] [place] - [facility] [place] [address]) Appt Note: 6 mo fu $0cp [address] [place] [id number] [phone]  
  
   
 [address] [place] [address] Upcoming Health Maintenance Date Due Hepatitis C Screening 1945 DTaP/Tdap/Td series (1 - Tdap) 11/17/1968 FOBT Q 1 YEAR AGE 50-75 11/17/1997 ZOSTER VACCINE AGE 60> 9/17/2007 GLAUCOMA SCREENING Q2Y 11/17/2012 Pneumococcal 65+ Low/Medium Risk (1 of 2 - PCV13) 11/17/2012 MEDICARE YEARLY EXAM 3/14/2018 Influenza Age 5 to Adult 8/1/2018 Allergies as of 9/11/2018  Review Complete On: 9/11/2018 By: 91 Jyoti Camarillo MD  
 No Known Allergies Current Immunizations  Never Reviewed No immunizations on file. Not reviewed this visit You Were Diagnosed With   
  
 Codes Comments Paroxysmal atrial fibrillation (HCC)    -  Primary ICD-10-CM: I48.0 ICD-9-CM: 427.31 Hyperthyroidism     ICD-10-CM: E05.90 ICD-9-CM: 242.90 Sinus bradycardia     ICD-10-CM: R00.1 ICD-9-CM: 427.89 Vitals BP Pulse Resp Height(growth percentile) Weight(growth percentile) SpO2  
 130/60 (BP 1 Location: Right arm, BP Patient Position: Sitting) 60 16 6' (1.829 m) 148 lb 12.8 oz (67.5 kg) 98% BMI Smoking Status 20.18 kg/m2 Former Smoker Vitals History BMI and BSA Data Body Mass Index Body Surface Area  
 20.18 kg/m 2 1.85 m 2 Preferred Pharmacy Pharmacy Name Phone [facility], [facility] [phone] Your Updated Medication List  
  
   
This list is accurate as of 9/11/18  2:29 PM.  Always use your most recent med list.  
  
  
  
  
 aspirin delayed-release 81 mg tablet Take  by mouth daily. atenolol 25 mg tablet Commonly known as:  TENORMIN  
TAKE ONE-HALF (1/2) TABLET NIGHTLY FLONASE 50 mcg/actuation nasal spray Generic drug:  fluticasone  
by Nasal route as needed. methIMAzole 5 mg tablet Commonly known as:  TAPAZOLE Take 2.5 mg by mouth every seven (7) days. Indications: Monday Only OTHER Calcium,magnesium ,zinc & vitamin d3 3 tablets daily VITAMIN B-12 1,000 mcg tablet Generic drug:  cyanocobalamin Take 1,000 mcg by mouth daily. Indications: nightly XARELTO 20 mg Tab tablet Generic drug:  rivaroxaban TAKE 1 TABLET DAILY WITH DINNER We Performed the Following CBC WITH AUTOMATED DIFF [14367 CPT(R)] METABOLIC PANEL, COMPREHENSIVE [45912 CPT(R)] PROTHROMBIN TIME + INR [39373 CPT(R)] Follow-up Instructions Return in about 4 weeks (around 10/9/2018). To-Do List   
 09/27/2018 2:00 PM  
  Appointment with [facility] 1 at [facility] CT ([phone]) CONTRAST STUDY: 1. The patient should not eat solid food four hours before the appointment but should be encouraged to drink clear liquids. 2. The patient will require IV access for contrast administration. 3. The patient should not take  Ibuprofen (Advil, Motrin, etc.) and Naproxen Sodium (Aleve, etc.)  on the day of the exam. Stopping non-steroidal anti-inflammatory agents (NSAIDs) like Ibuprofen decreases the risk of kidney damage from the x-ray contrast (dye).  4. Bring any non [facility] facility films/images pertaining to the area of interest with you on the day of appointment. 5. Bring current lab work if available(within last 90 days CMP) ***If scheduled at [facility], iSTAT is not available, labs will need to be done before appointment*** 6. Check in at registration at least 30 minutes before appt time unless you were instructed to do otherwise.  
  
 09/28/2018 Imaging:  CTA CHEST W OR W WO CONT   
  
 09/28/2018 Imaging:  XR CHEST PA LAT Introducing [facility]! Mari Damian introduces [employer] patient portal. Now you can access parts of your medical record, email your doctor's office, and request medication refills online. 1. In your internet browser, go to [email or web address] 2. Click on the First Time User? Click Here link in the Sign In box. You will see the New Member Sign Up page. 3. Enter your [facility] Access Code exactly as it appears below. You will not need to use this code after youve completed the sign-up process. If you do not sign up before the expiration date, you must request a new code. · [facility] Access Code: [email or web address] Expires: 11/11/2018 10:22 AM 
 
4. Enter the last four digits of your Social Security Number (xxxx) and Date of Birth (mm/dd/yyyy) as indicated and click Submit. You will be taken to the next sign-up page. 5. Create a [employer] ID. This will be your [facility] login ID and cannot be changed, so think of one that is secure and easy to remember. 6. Create a [employer] password. You can change your password at any time. 7. Enter your Password Reset Question and Answer. This can be used at a later time if you forget your password. 8. Enter your e-mail address. You will receive e-mail notification when new information is available in [address] 9. Click Sign Up. You can now view and download portions of your medical record. 10. Click the Download Summary menu link to download a portable copy of your medical information. If you have questions, please visit the Frequently Asked Questions section of the [employer] website. Remember, "[employer] is NOT to be used for urgent needs. For medical emergencies, dial 911. Now available from your iPhone and Android! Please provide this summary of care documentation to your next provider. Your primary care clinician is listed as Judy Feliz. If you have any questions after today's visit, please call [phone].

## 2018-09-11 NOTE — PROGRESS NOTES
Patient here to establish care he has not had any A-Fib episodes since mid August lasting a few days had syncope spell and hospitalized at that time.

## 2018-09-11 NOTE — PROGRESS NOTES
Subjective: Karla Hernandez is a 81 y.o. male is here for EP consult. He presented to \[facility]\"" with AF with rvr and syncope. The patient denies chest pain/ shortness of breath, orthopnea, PND, LE edema, palpitations, syncope, presyncope or fatigue. 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    Hyperthyroidism     Paroxysmal atrial fibrillation (HCC)       Past Surgical History:   Procedure Laterality Date    ECHO 2D ADULT  8/2011    EF 60%, Trace AI, PA 41     No Known Allergies   Family History   Problem Relation Age of Onset    No Known Problems Mother     No Known Problems Father     negative for cardiac disease  Social History     Social History    Marital status:      Spouse name: N/A    Number of children: N/A    Years of education: N/A     Social History Main Topics    Smoking status: Former Smoker     Types: Cigarettes     Quit date: 6/5/1970    Smokeless tobacco: Never Used    Alcohol use None    Drug use: None    Sexual activity: Not Asked     Other Topics Concern    None     Social History Narrative     Current Outpatient Prescriptions   Medication Sig    OTHER Calcium,magnesium ,zinc & vitamin d3 3 tablets daily    methIMAzole (TAPAZOLE) 5 mg tablet Take 2.5 mg by mouth every seven (7) days. Indications: Monday Only    atenolol (TENORMIN) 25 mg tablet TAKE ONE-HALF (1/2) TABLET NIGHTLY    XARELTO 20 mg tab tablet TAKE 1 TABLET DAILY WITH DINNER    cyanocobalamin (VITAMIN B-12) 1,000 mcg tablet Take 1,000 mcg by mouth daily. Indications: nightly    aspirin delayed-release 81 mg tablet Take  by mouth daily.     fluticasone (FLONASE) 50 mcg/Actuation nasal spray by Nasal route as needed. No current facility-administered medications for this visit. Vitals:    09/11/18 1341 09/11/18 1342   BP: 120/60 130/60   Pulse: 60    Resp: 16    SpO2: 98%    Weight: 148 lb 12.8 oz (67.5 kg)    Height: 6' (1.829 m)        I have reviewed the nurses notes, vitals, problem list, allergy list, medical history, family, social history and medications. Review of Symptoms:    General: Pt denies excessive weight gain or loss. Pt is able to conduct ADL's  HEENT: Denies blurred vision, headaches, epistaxis and difficulty swallowing. Respiratory: Denies shortness of breath, DONOHUE, wheezing or stridor. Cardiovascular: +palpitations, Denies precordial pain, edema or PND  Gastrointestinal: Denies poor appetite, indigestion, abdominal pain or blood in stool  Urinary: Denies dysuria, pyuria  Musculoskeletal: Denies pain or swelling from muscles or joints  Neurologic: Denies tremor, paresthesias, or sensory motor disturbance  Skin: Denies rash, itching or texture change. Psych: Denies depression      Physical Exam:      General: Well developed, in no acute distress. HEENT: Eyes - PERRL, no jvd  Heart:  Normal S1/S2 negative S3 or S4. Regular, no murmur, gallop or rub.   Respiratory: Clear bilaterally x 4, no wheezing or rales  Abdomen:   Soft, non-tender, bowel sounds are active.   Extremities:  No edema, normal cap refill, no cyanosis. Musculoskeletal: No clubbing  Neuro: A&Ox3, speech clear, gait stable. Skin: Skin color is normal. No rashes or lesions.  Non diaphoretic  Vascular: 2+ pulses symmetric in all extremities    Cardiographics    Ekg: nsr    ekg at [facility] - AF with rvr    Results for orders placed or performed during the hospital encounter of 08/22/18   EKG, 12 LEAD, INITIAL   Result Value Ref Range    Ventricular Rate 81 BPM    Atrial Rate 340 BPM    QRS Duration 82 ms    Q-T Interval 360 ms    QTC Calculation (Bezet) 418 ms    Calculated R Axis 84 degrees    Calculated T Axis 61 degrees    Diagnosis       Atrial fibrillation  Low voltage QRS  Abnormal ECG  No previous ECGs available  Confirmed by Simon Gentile MD, --- ([id number]) on 8/23/2018 12:06:42 AM     Results for orders placed or performed in visit on 05/12/14   AMB POC EKG ROUTINE W/ 12 LEADS, INTER & REP    Narrative    Gabi Bettencourt             Lab Results   Component Value Date/Time    WBC 7.5 08/23/2018 06:13 AM    HGB 12.7 08/23/2018 06:13 AM    HCT 36.5 (L) 08/23/2018 06:13 AM    PLATELET 848 88/46/0991 06:13 AM    MCV 94.6 08/23/2018 06:13 AM      Lab Results   Component Value Date/Time    Sodium 140 08/23/2018 06:13 AM    Potassium 3.8 08/23/2018 06:13 AM    Chloride 106 08/23/2018 06:13 AM    CO2 29 08/23/2018 06:13 AM    Anion gap 5 08/23/2018 06:13 AM    Glucose 96 08/23/2018 06:13 AM    BUN 6 08/23/2018 06:13 AM    Creatinine 0.63 (L) 08/23/2018 06:13 AM    BUN/Creatinine ratio 10 (L) 08/23/2018 06:13 AM    GFR est AA >60 08/23/2018 06:13 AM    GFR est non-AA >60 08/23/2018 06:13 AM    Calcium 8.6 08/23/2018 06:13 AM    Bilirubin, total 1.4 (H) 08/22/2018 07:33 AM    AST (SGOT) 18 08/22/2018 07:33 AM    Alk. phosphatase 61 08/22/2018 07:33 AM    Protein, total 7.2 08/22/2018 07:33 AM    Albumin 3.8 08/22/2018 07:33 AM    Globulin 3.4 08/22/2018 07:33 AM    A-G Ratio 1.1 08/22/2018 07:33 AM    ALT (SGPT) 21 08/22/2018 07:33 AM         Assessment:     Assessment:        ICD-10-CM ICD-9-CM    1. Paroxysmal atrial fibrillation (HCC) I48.0 427.31 OTHER      CBC WITH AUTOMATED DIFF      PROTHROMBIN TIME + INR      METABOLIC PANEL, COMPREHENSIVE      XR CHEST PA LAT      CTA CHEST W OR W WO CONT   2. Hyperthyroidism E05.90 242.90 OTHER      CBC WITH AUTOMATED DIFF      PROTHROMBIN TIME + INR      METABOLIC PANEL, COMPREHENSIVE      XR CHEST PA LAT      CTA CHEST W OR W WO CONT   3.  Sinus bradycardia R00.1 427.89 OTHER      CBC WITH AUTOMATED DIFF      PROTHROMBIN TIME + INR      METABOLIC PANEL, COMPREHENSIVE      XR CHEST PA LAT      CTA CHEST W OR W WO CONT     Orders Placed This Encounter    XR CHEST PA LAT     Standing Status:   Future     Standing Expiration Date:   10/11/2019     Order Specific Question:   Reason for Exam     Answer:   pre op     Order Specific Question:   Is Patient Allergic to Contrast Dye? Answer:   No    CTA CHEST W OR W WO CONT     Standing Status:   Future     Standing Expiration Date:   10/11/2019     Order Specific Question:   Is Patient Allergic to Contrast Dye? Answer:   No     Order Specific Question:   STAT Creatinine as indicated     Answer: Yes    CBC WITH AUTOMATED DIFF    PROTHROMBIN TIME + INR    METABOLIC PANEL, COMPREHENSIVE    OTHER     Sig: Calcium,magnesium ,zinc & vitamin d3 3 tablets daily        Plan:   Mr Lauren Barcenas is a pleasant gentleman with symptomatic PAF. He is a candidate for an afib ablation/ILR. I discussed the risks/benefits/alternatives of the procedure with the patient. Risks include (but are not limited to) bleeding, heart block, infection, cva/mi/tamponade/esophageal perforation/pv stenosis/death. The patient understands that there is a 1-4% major complication rate and agrees to proceed. Thank you for this interesting consultation. Continue medical management for AF. Thank you for allowing me to participate in Rosaline Merlin 's care.     Montana Alegria MD, Konrad Juan

## 2018-09-24 ENCOUNTER — HOSPITAL ENCOUNTER (OUTPATIENT)
Dept: LAB | Age: 73
Discharge: HOME | End: 2018-09-24
Payer: MEDICARE

## 2018-09-24 ENCOUNTER — HOSPITAL ENCOUNTER (OUTPATIENT)
Dept: GENERAL RADIOLOGY | Age: 73
Discharge: HOME | End: 2018-09-24
Payer: MEDICARE

## 2018-09-24 DIAGNOSIS — Z01.811 PRE-OP CHEST EXAM: ICD-10-CM

## 2018-09-24 PROCEDURE — 71046 X-RAY EXAM CHEST 2 VIEWS: CPT

## 2018-09-24 PROCEDURE — 85025 COMPLETE CBC W/AUTO DIFF WBC: CPT

## 2018-09-24 PROCEDURE — 80053 COMPREHEN METABOLIC PANEL: CPT

## 2018-09-24 PROCEDURE — 85610 PROTHROMBIN TIME: CPT

## 2018-09-24 PROCEDURE — 36415 COLL VENOUS BLD VENIPUNCTURE: CPT

## 2018-09-25 LAB
ALBUMIN SERPL-MCNC: 4.2 G/DL (ref 3.5–4.8)
ALBUMIN/GLOB SERPL: 1.8 {RATIO} (ref 1.2–2.2)
ALP SERPL-CCNC: 56 IU/L (ref 39–117)
ALT SERPL-CCNC: 13 IU/L (ref 0–44)
AST SERPL-CCNC: 21 IU/L (ref 0–40)
BASOPHILS # BLD AUTO: 0 X10E3/UL (ref 0–0.2)
BASOPHILS NFR BLD AUTO: 0 %
BILIRUB SERPL-MCNC: 0.7 MG/DL (ref 0–1.2)
BUN SERPL-MCNC: 11 MG/DL (ref 8–27)
BUN/CREAT SERPL: 15 (ref 10–24)
CALCIUM SERPL-MCNC: 9.4 MG/DL (ref 8.6–10.2)
CHLORIDE SERPL-SCNC: 100 MMOL/L (ref 96–106)
CO2 SERPL-SCNC: 28 MMOL/L (ref 20–29)
CREAT SERPL-MCNC: 0.74 MG/DL (ref 0.76–1.27)
EOSINOPHIL # BLD AUTO: 0.2 X10E3/UL (ref 0–0.4)
EOSINOPHIL NFR BLD AUTO: 4 %
ERYTHROCYTE [DISTWIDTH] IN BLOOD BY AUTOMATED COUNT: 13 % (ref 12.3–15.4)
GLOBULIN SER CALC-MCNC: 2.4 G/DL (ref 1.5–4.5)
GLUCOSE SERPL-MCNC: 77 MG/DL (ref 65–99)
HCT VFR BLD AUTO: 35.9 % (ref 37.5–51)
HGB BLD-MCNC: 12.3 G/DL (ref 13–17.7)
IMM GRANULOCYTES # BLD: 0 X10E3/UL (ref 0–0.1)
IMM GRANULOCYTES NFR BLD: 0 %
INR PPP: 1.2 (ref 0.8–1.2)
LYMPHOCYTES # BLD AUTO: 1.4 X10E3/UL (ref 0.7–3.1)
LYMPHOCYTES NFR BLD AUTO: 28 %
MCH RBC QN AUTO: 32.7 PG (ref 26.6–33)
MCHC RBC AUTO-ENTMCNC: 34.3 G/DL (ref 31.5–35.7)
MCV RBC AUTO: 96 FL (ref 79–97)
MONOCYTES # BLD AUTO: 0.7 X10E3/UL (ref 0.1–0.9)
MONOCYTES NFR BLD AUTO: 13 %
NEUTROPHILS # BLD AUTO: 2.8 X10E3/UL (ref 1.4–7)
NEUTROPHILS NFR BLD AUTO: 55 %
PLATELET # BLD AUTO: 242 X10E3/UL (ref 150–379)
POTASSIUM SERPL-SCNC: 4.8 MMOL/L (ref 3.5–5.2)
PROT SERPL-MCNC: 6.6 G/DL (ref 6–8.5)
PROTHROMBIN TIME: 12.2 SEC (ref 9.1–12)
RBC # BLD AUTO: 3.76 X10E6/UL (ref 4.14–5.8)
SODIUM SERPL-SCNC: 140 MMOL/L (ref 134–144)
WBC # BLD AUTO: 5.1 X10E3/UL (ref 3.4–10.8)

## 2018-09-27 ENCOUNTER — HOSPITAL ENCOUNTER (OUTPATIENT)
Dept: CT IMAGING | Age: 73
Discharge: HOME | End: 2018-09-27
Attending: INTERNAL MEDICINE
Payer: MEDICARE

## 2018-09-27 DIAGNOSIS — E05.90 HYPERTHYROIDISM: ICD-10-CM

## 2018-09-27 DIAGNOSIS — I48.0 PAROXYSMAL ATRIAL FIBRILLATION (HCC): ICD-10-CM

## 2018-09-27 DIAGNOSIS — R00.1 SINUS BRADYCARDIA: ICD-10-CM

## 2018-09-27 PROCEDURE — 74011250636 HC RX REV CODE- 250/636: Performed by: INTERNAL MEDICINE

## 2018-09-27 PROCEDURE — 71275 CT ANGIOGRAPHY CHEST: CPT

## 2018-09-27 PROCEDURE — 74011636320 HC RX REV CODE- 636/320: Performed by: INTERNAL MEDICINE

## 2018-09-27 RX ORDER — SODIUM CHLORIDE 9 MG/ML
50 INJECTION, SOLUTION INTRAVENOUS
Status: COMPLETED | OUTPATIENT
Start: 2018-09-27 | End: 2018-09-27

## 2018-09-27 RX ORDER — SODIUM CHLORIDE 0.9 % (FLUSH) 0.9 %
10 SYRINGE (ML) INJECTION
Status: COMPLETED | OUTPATIENT
Start: 2018-09-27 | End: 2018-09-27

## 2018-09-27 RX ADMIN — Medication 10 ML: at 14:35

## 2018-09-27 RX ADMIN — SODIUM CHLORIDE 50 ML/HR: 900 INJECTION, SOLUTION INTRAVENOUS at 14:35

## 2018-09-27 RX ADMIN — IOPAMIDOL 100 ML: 755 INJECTION, SOLUTION INTRAVENOUS at 14:35

## 2018-10-03 ENCOUNTER — ANESTHESIA EVENT (OUTPATIENT)
Dept: MEDSURG UNIT | Age: 73
End: 2018-10-03
Payer: MEDICARE

## 2018-10-03 ENCOUNTER — HOSPITAL ENCOUNTER (OUTPATIENT)
Dept: NON INVASIVE DIAGNOSTICS | Age: 73
Setting detail: OBSERVATION
Discharge: HOME | End: 2018-10-04
Attending: INTERNAL MEDICINE | Admitting: INTERNAL MEDICINE
Payer: MEDICARE

## 2018-10-03 ENCOUNTER — ANESTHESIA (OUTPATIENT)
Dept: MEDSURG UNIT | Age: 73
End: 2018-10-03
Payer: MEDICARE

## 2018-10-03 PROBLEM — I48.91 A-FIB (HCC): Status: ACTIVE | Noted: 2018-10-03

## 2018-10-03 PROCEDURE — 74011636320 HC RX REV CODE- 636/320

## 2018-10-03 PROCEDURE — 77030016894 HC CBL EP DX CATH3 STJU -B

## 2018-10-03 PROCEDURE — 77030030261 HC CBL UMB ELEC DISP MEDT -C

## 2018-10-03 PROCEDURE — 77030018729 HC ELECTRD DEFIB PAD CARD -B

## 2018-10-03 PROCEDURE — 76210000006 HC OR PH I REC 0.5 TO 1 HR

## 2018-10-03 PROCEDURE — 77030010880 HC CBL EP SUPRME STJU -C

## 2018-10-03 PROCEDURE — 77030034850

## 2018-10-03 PROCEDURE — 99218 HC RM OBSERVATION: CPT

## 2018-10-03 PROCEDURE — 77030030278 HC COAX UMB DISP MEDT -C

## 2018-10-03 PROCEDURE — 76060000034 HC ANESTHESIA 1.5 TO 2 HR

## 2018-10-03 PROCEDURE — C1730 CATH, EP, 19 OR FEW ELECT: HCPCS

## 2018-10-03 PROCEDURE — C1893 INTRO/SHEATH, FIXED,NON-PEEL: HCPCS

## 2018-10-03 PROCEDURE — 77030030806 HC PTCH ENSIT NAVX STJU -G

## 2018-10-03 PROCEDURE — 77030029065 HC DRSG HEMO QCLOT ZMED -B

## 2018-10-03 PROCEDURE — 77030020506 HC NDL TRNSPTL NRG BAYL -F

## 2018-10-03 PROCEDURE — 93613 INTRACARDIAC EPHYS 3D MAPG: CPT

## 2018-10-03 PROCEDURE — 77030008543 HC TBNG MON PRSS MRTM -A

## 2018-10-03 PROCEDURE — C1894 INTRO/SHEATH, NON-LASER: HCPCS

## 2018-10-03 PROCEDURE — 77030029359 HC PRB ESOPH TEMP CATH ANTM -F

## 2018-10-03 PROCEDURE — C1769 GUIDE WIRE: HCPCS

## 2018-10-03 PROCEDURE — 77030013797 HC KT TRNSDUC PRSSR EDWD -A

## 2018-10-03 PROCEDURE — 77030026438 HC STYL ET INTUB CARD -A: Performed by: ANESTHESIOLOGY

## 2018-10-03 PROCEDURE — 77030029163 HC CBL EP DX ACHV DISP MEDT -C

## 2018-10-03 PROCEDURE — 77030008684 HC TU ET CUF COVD -B: Performed by: ANESTHESIOLOGY

## 2018-10-03 PROCEDURE — 74011000250 HC RX REV CODE- 250

## 2018-10-03 PROCEDURE — C1733 CATH, EP, OTHR THAN COOL-TIP: HCPCS

## 2018-10-03 PROCEDURE — C1759 CATH, INTRA ECHOCARDIOGRAPHY: HCPCS

## 2018-10-03 PROCEDURE — 74011250636 HC RX REV CODE- 250/636

## 2018-10-03 PROCEDURE — 74011250636 HC RX REV CODE- 250/636: Performed by: INTERNAL MEDICINE

## 2018-10-03 PROCEDURE — 77030013079 HC BLNKT BAIR HGGR 3M -A: Performed by: ANESTHESIOLOGY

## 2018-10-03 RX ORDER — MIDAZOLAM HYDROCHLORIDE 1 MG/ML
1-5 INJECTION, SOLUTION INTRAMUSCULAR; INTRAVENOUS
Status: DISCONTINUED | OUTPATIENT
Start: 2018-10-03 | End: 2018-10-04 | Stop reason: HOSPADM

## 2018-10-03 RX ORDER — FENTANYL CITRATE 50 UG/ML
12.5-5 INJECTION, SOLUTION INTRAMUSCULAR; INTRAVENOUS
Status: DISCONTINUED | OUTPATIENT
Start: 2018-10-03 | End: 2018-10-04 | Stop reason: HOSPADM

## 2018-10-03 RX ORDER — HEPARIN SODIUM 1000 [USP'U]/ML
30000 INJECTION, SOLUTION INTRAVENOUS; SUBCUTANEOUS
Status: DISCONTINUED | OUTPATIENT
Start: 2018-10-03 | End: 2018-10-03 | Stop reason: HOSPADM

## 2018-10-03 RX ORDER — SODIUM CHLORIDE 0.9 % (FLUSH) 0.9 %
5-10 SYRINGE (ML) INJECTION EVERY 8 HOURS
Status: DISCONTINUED | OUTPATIENT
Start: 2018-10-03 | End: 2018-10-04 | Stop reason: HOSPADM

## 2018-10-03 RX ORDER — EPHEDRINE SULFATE 50 MG/ML
INJECTION, SOLUTION INTRAVENOUS AS NEEDED
Status: DISCONTINUED | OUTPATIENT
Start: 2018-10-03 | End: 2018-10-03 | Stop reason: HOSPADM

## 2018-10-03 RX ORDER — HEPARIN SODIUM 200 [USP'U]/100ML
2000 INJECTION, SOLUTION INTRAVENOUS ONCE
Status: COMPLETED | OUTPATIENT
Start: 2018-10-03 | End: 2018-10-03

## 2018-10-03 RX ORDER — MIDAZOLAM HYDROCHLORIDE 1 MG/ML
INJECTION, SOLUTION INTRAMUSCULAR; INTRAVENOUS
Status: COMPLETED
Start: 2018-10-03 | End: 2018-10-03

## 2018-10-03 RX ORDER — FENTANYL CITRATE 50 UG/ML
INJECTION, SOLUTION INTRAMUSCULAR; INTRAVENOUS AS NEEDED
Status: DISCONTINUED | OUTPATIENT
Start: 2018-10-03 | End: 2018-10-03 | Stop reason: HOSPADM

## 2018-10-03 RX ORDER — SODIUM CHLORIDE 0.9 % (FLUSH) 0.9 %
5-10 SYRINGE (ML) INJECTION AS NEEDED
Status: DISCONTINUED | OUTPATIENT
Start: 2018-10-03 | End: 2018-10-04 | Stop reason: HOSPADM

## 2018-10-03 RX ORDER — PROTAMINE SULFATE 10 MG/ML
25-100 INJECTION, SOLUTION INTRAVENOUS
Status: DISCONTINUED | OUTPATIENT
Start: 2018-10-03 | End: 2018-10-04 | Stop reason: HOSPADM

## 2018-10-03 RX ORDER — ONDANSETRON 2 MG/ML
INJECTION INTRAMUSCULAR; INTRAVENOUS AS NEEDED
Status: DISCONTINUED | OUTPATIENT
Start: 2018-10-03 | End: 2018-10-03 | Stop reason: HOSPADM

## 2018-10-03 RX ORDER — DOBUTAMINE HYDROCHLORIDE 200 MG/100ML
INJECTION INTRAVENOUS
Status: DISCONTINUED | OUTPATIENT
Start: 2018-10-03 | End: 2018-10-03 | Stop reason: HOSPADM

## 2018-10-03 RX ORDER — FENTANYL CITRATE 50 UG/ML
INJECTION, SOLUTION INTRAMUSCULAR; INTRAVENOUS
Status: COMPLETED
Start: 2018-10-03 | End: 2018-10-03

## 2018-10-03 RX ORDER — PROTAMINE SULFATE 10 MG/ML
INJECTION, SOLUTION INTRAVENOUS
Status: COMPLETED
Start: 2018-10-03 | End: 2018-10-03

## 2018-10-03 RX ORDER — SUCCINYLCHOLINE CHLORIDE 20 MG/ML
INJECTION INTRAMUSCULAR; INTRAVENOUS AS NEEDED
Status: DISCONTINUED | OUTPATIENT
Start: 2018-10-03 | End: 2018-10-03 | Stop reason: HOSPADM

## 2018-10-03 RX ORDER — ATROPINE SULFATE 0.1 MG/ML
INJECTION INTRAVENOUS
Status: COMPLETED
Start: 2018-10-03 | End: 2018-10-03

## 2018-10-03 RX ORDER — DEXAMETHASONE SODIUM PHOSPHATE 4 MG/ML
INJECTION, SOLUTION INTRA-ARTICULAR; INTRALESIONAL; INTRAMUSCULAR; INTRAVENOUS; SOFT TISSUE AS NEEDED
Status: DISCONTINUED | OUTPATIENT
Start: 2018-10-03 | End: 2018-10-03 | Stop reason: HOSPADM

## 2018-10-03 RX ORDER — MIDAZOLAM HYDROCHLORIDE 1 MG/ML
INJECTION, SOLUTION INTRAMUSCULAR; INTRAVENOUS AS NEEDED
Status: DISCONTINUED | OUTPATIENT
Start: 2018-10-03 | End: 2018-10-03 | Stop reason: HOSPADM

## 2018-10-03 RX ORDER — PROTAMINE SULFATE 10 MG/ML
INJECTION, SOLUTION INTRAVENOUS AS NEEDED
Status: DISCONTINUED | OUTPATIENT
Start: 2018-10-03 | End: 2018-10-03 | Stop reason: HOSPADM

## 2018-10-03 RX ORDER — HEPARIN SODIUM 1000 [USP'U]/ML
INJECTION, SOLUTION INTRAVENOUS; SUBCUTANEOUS AS NEEDED
Status: DISCONTINUED | OUTPATIENT
Start: 2018-10-03 | End: 2018-10-03 | Stop reason: HOSPADM

## 2018-10-03 RX ORDER — ROCURONIUM BROMIDE 10 MG/ML
INJECTION, SOLUTION INTRAVENOUS AS NEEDED
Status: DISCONTINUED | OUTPATIENT
Start: 2018-10-03 | End: 2018-10-03 | Stop reason: HOSPADM

## 2018-10-03 RX ORDER — ACETAMINOPHEN 325 MG/1
650 TABLET ORAL
Status: DISCONTINUED | OUTPATIENT
Start: 2018-10-03 | End: 2018-10-04 | Stop reason: HOSPADM

## 2018-10-03 RX ORDER — SODIUM CHLORIDE, SODIUM LACTATE, POTASSIUM CHLORIDE, CALCIUM CHLORIDE 600; 310; 30; 20 MG/100ML; MG/100ML; MG/100ML; MG/100ML
INJECTION, SOLUTION INTRAVENOUS
Status: DISCONTINUED | OUTPATIENT
Start: 2018-10-03 | End: 2018-10-03 | Stop reason: HOSPADM

## 2018-10-03 RX ORDER — HYDROCODONE BITARTRATE AND ACETAMINOPHEN 5; 325 MG/1; MG/1
1 TABLET ORAL
Status: DISCONTINUED | OUTPATIENT
Start: 2018-10-03 | End: 2018-10-04 | Stop reason: HOSPADM

## 2018-10-03 RX ORDER — PROPOFOL 10 MG/ML
INJECTION, EMULSION INTRAVENOUS AS NEEDED
Status: DISCONTINUED | OUTPATIENT
Start: 2018-10-03 | End: 2018-10-03 | Stop reason: HOSPADM

## 2018-10-03 RX ORDER — DOBUTAMINE HYDROCHLORIDE 200 MG/100ML
0-10 INJECTION INTRAVENOUS
Status: DISCONTINUED | OUTPATIENT
Start: 2018-10-03 | End: 2018-10-04 | Stop reason: HOSPADM

## 2018-10-03 RX ORDER — DOBUTAMINE HYDROCHLORIDE 200 MG/100ML
INJECTION INTRAVENOUS
Status: DISPENSED
Start: 2018-10-03 | End: 2018-10-03

## 2018-10-03 RX ADMIN — DOBUTAMINE HYDROCHLORIDE 20 MCG/KG/MIN: 200 INJECTION INTRAVENOUS at 12:10

## 2018-10-03 RX ADMIN — SUCCINYLCHOLINE CHLORIDE 140 MG: 20 INJECTION INTRAMUSCULAR; INTRAVENOUS at 11:19

## 2018-10-03 RX ADMIN — Medication 10 ML: at 22:13

## 2018-10-03 RX ADMIN — EPHEDRINE SULFATE 5 MG: 50 INJECTION, SOLUTION INTRAVENOUS at 11:39

## 2018-10-03 RX ADMIN — FENTANYL CITRATE 100 MCG: 50 INJECTION, SOLUTION INTRAMUSCULAR; INTRAVENOUS at 11:19

## 2018-10-03 RX ADMIN — SODIUM CHLORIDE, SODIUM LACTATE, POTASSIUM CHLORIDE, CALCIUM CHLORIDE: 600; 310; 30; 20 INJECTION, SOLUTION INTRAVENOUS at 11:12

## 2018-10-03 RX ADMIN — MIDAZOLAM HYDROCHLORIDE 2 MG: 1 INJECTION, SOLUTION INTRAMUSCULAR; INTRAVENOUS at 11:17

## 2018-10-03 RX ADMIN — PROPOFOL 20 MG: 10 INJECTION, EMULSION INTRAVENOUS at 11:55

## 2018-10-03 RX ADMIN — IOPAMIDOL 45 ML: 755 INJECTION, SOLUTION INTRAVENOUS at 12:00

## 2018-10-03 RX ADMIN — HEPARIN SODIUM IN SODIUM CHLORIDE 4000 UNITS: 200 INJECTION INTRAVENOUS at 11:43

## 2018-10-03 RX ADMIN — EPHEDRINE SULFATE 5 MG: 50 INJECTION, SOLUTION INTRAVENOUS at 11:37

## 2018-10-03 RX ADMIN — HEPARIN SODIUM 15000 UNITS: 1000 INJECTION, SOLUTION INTRAVENOUS; SUBCUTANEOUS at 11:39

## 2018-10-03 RX ADMIN — ATROPINE SULFATE 0.5 MG: 0.1 INJECTION PARENTERAL at 17:05

## 2018-10-03 RX ADMIN — EPHEDRINE SULFATE 10 MG: 50 INJECTION, SOLUTION INTRAVENOUS at 11:45

## 2018-10-03 RX ADMIN — Medication 10 ML: at 15:00

## 2018-10-03 RX ADMIN — PROPOFOL 150 MG: 10 INJECTION, EMULSION INTRAVENOUS at 11:19

## 2018-10-03 RX ADMIN — PROTAMINE SULFATE 100 MG: 10 INJECTION, SOLUTION INTRAVENOUS at 12:11

## 2018-10-03 RX ADMIN — ROCURONIUM BROMIDE 10 MG: 10 INJECTION, SOLUTION INTRAVENOUS at 11:19

## 2018-10-03 RX ADMIN — ONDANSETRON 4 MG: 2 INJECTION INTRAMUSCULAR; INTRAVENOUS at 12:12

## 2018-10-03 RX ADMIN — DEXAMETHASONE SODIUM PHOSPHATE 10 MG: 4 INJECTION, SOLUTION INTRA-ARTICULAR; INTRALESIONAL; INTRAMUSCULAR; INTRAVENOUS; SOFT TISSUE at 11:25

## 2018-10-03 NOTE — PROGRESS NOTES
1645: Bedrest complete. Assisted patient with ambulating to the restroom. Patient noticed bleeding while sitting on the toilet and started to feel faint. Assisted patient back to bed. Held pressure. Initial BP was 70/40s with HR in the 50s. No hematoma noted. Pushed . 5mg of atropine and laid the patient flat. Patient was very clammy and diaphoretic.  
 
1700: Initiated a 250cc bolus 1705: BP and HR are about baseline (110/60 and 70s) 1710: New dressing applied to Right Groin with bedrest restarted. Patient given urinal if needed.

## 2018-10-03 NOTE — PERIOP NOTES
Handoff Report from Operating Room to PACU Report received from [address] and [address]  regarding Katya Walker. Surgeon(s): 
Case Anesthesia  And Procedure(s) (LRB): 
PACU/RECOVERY                 EP/LAB (N/A)  confirmed  
with dressings discussed. Anesthesia type, drugs, patient history, complications, estimated blood loss, vital signs, intake and output, and last pain medication, lines and temperature were reviewed.

## 2018-10-03 NOTE — ANESTHESIA POSTPROCEDURE EVALUATION
Post-Anesthesia Evaluation and Assessment Patient: Kevon Campuzano MRN: [id number]  SSN: [id number] YOB: 1945  Age: 81 y.o. Sex: male Cardiovascular Function/Vital Signs Visit Vitals  /62  Pulse 63  Temp 36.5 °C (97.7 °F)  Resp 14  
 Ht 6' (1.829 m)  Wt 65.8 kg (145 lb)  SpO2 99%  BMI 19.67 kg/m2 Patient is status post general anesthesia for * No procedures listed *. Nausea/Vomiting: None Postoperative hydration reviewed and adequate. Pain: 
Pain Scale 1: Numeric (0 - 10) (10/03/18 1348) Pain Intensity 1: 0 (10/03/18 1348) Managed Neurological Status:  
Neuro (WDL): Within Defined Limits (10/03/18 1337) Neuro Neurologic State: Alert (10/03/18 1337) Orientation Level: Oriented to person;Oriented to place;Oriented to situation (10/03/18 1245) Cognition: Follows commands (10/03/18 1245) Speech: Clear (10/03/18 1245) LUE Motor Response: Purposeful (10/03/18 1337) LLE Motor Response: Purposeful (10/03/18 1337) RUE Motor Response: Purposeful (10/03/18 1337) RLE Motor Response: Purposeful (10/03/18 1337) At baseline Mental Status and Level of Consciousness: Arousable Pulmonary Status:  
O2 Device: Room air (10/03/18 1348) Adequate oxygenation and airway patent Complications related to anesthesia: None Post-anesthesia assessment completed. No concerns Signed By: Samra Fields MD   
 October 3, 2018

## 2018-10-03 NOTE — PROCEDURES
[address] [place] [address]  [phone]    Indications and Pre-Procedure Diagnosis:  Letty Dowell is a 81 y.o. male with AF is referred for electr-physiologic evaluation and intervention. Post Procedure Diagnosis    AF  Sinus bradycardia    Atrial Fibrillation Ablation Summary  Informed consent was obtained. The patient was brought to the EP lab where KEYUR demonstrated no thrombus in the left atrial appendage. All vascular access sites were prepped and draped in the usual sterile fashion and the Seldinger technique was used to catherize the RFV and LFV with multi-polar electrode catheters, which were placed in the appropriate intra-cardiac sites under fluoroscopic guidance (see catheter list). Right and left atrial pacing and recording, His bundle recording, and right ventricular pacing and recording were performed. Continuous pulse oximetry and cuff BP monitoring were performed. During the procedure, the patient received Versed, Fentanyl and Propofol for sedation per anesthesia personnel. Transeptal Puncture  A transeptal atrial puncture was performed using fluoroscopic and intracardiac ultrasound guidance. An SL1 sheath was dragged from the SVC along the septum until a sudden leftward displacement was observed. Engagement of the Fossa Ovalis was confirmed by the interatrial tenting seen under intracardiac ultrasound guidance. The Hoy Resides NRG needle was advanced into the left atrium and its positioned confirmed with contrast injection. The dilator and sheath were advanced carefully over the needle into the body of the left atrium and the dilator/needle removed. A Flexcath sheath was exchanged over the wire for the SL1 sheath.  No pericardial effusion was appreciated on intracardiac ultrasound so a bolus injection of heparin 100 units/kg was administered, with additional boluses q 30 minutes as necessary so that the activated clotting time maintained was between 300 and 400 seconds. A 3D shell representing the left atrium and pulmonary veins was constructed with the electroanatomic mapping system. An Achieve circular mapping catheter was advanced into the left atrium through the Flexcath sheath and a map of the body of the LA and the pulmonary veins was created. Pulmonary vein venography was also performed at that time. A 28 mm Medtronic Cryo balloon was advanced into the left atrium. Cryo ablation of the left atrium was performed at the PV ostia to encircle the right and left PVs. Cryo energy was applied for a total of 16 minutes with confirmed entrance/exit block of four pulmonary veins. The patient was cardioverted to sinus rhythm with one 360J biphasic synchronized shock. Autonomic manipulation with Dobutamine @ 20 mcg/min was performed during this procedure. Post ablation, rapid atrial pacing to 240 msec, on and off dobutamine, failed to induce any atrial arrhythmias. At the end of the procedure, all catheters were removed, heparin reversed, groin sheaths pulled and hemostasis achieved. The patient left the laboratory in a stable condition. Fluoroscopy and procedure times were 5 and 60 minutes respectively. Estimated blood loss: <10 ml. Sharp counts: correct. Specimen (s) collected: none. The procedure related complication occurred: none. The following problems were encountered: none. Conduction intervals (ms)    A-A A-H H-V P-R QRS Q-T R-R V-V  1395 125 49 140  1375    AV rehan conduction    VA Block when pacing at 550 ms    Findings and Summary    This study demonstrates:  1. PVI of all 4 PVs  2. Cardioversion to sinus rhythm  3. No further inducible atrial arrhythmias on dobutamine with rapid atrial pacing    Recommendation:  1. 9[address] [facility]    Thank you for allowing me to participate in this patients care.     Juan Rodas MD, Juan José Stephen

## 2018-10-03 NOTE — IP AVS SNAPSHOT
[address] [place] [address] 
[phone] Patient: Halina Rodgers MRN: [id number] EHB:23/00/6512 About your hospitalization You were admitted on:  October 3, 2018 You last received care in the:  \[facility]\"" 2 [facility] CARDIO You were discharged on:  October 4, 2018 Why you were hospitalized Your primary diagnosis was:  Paroxysmal Atrial Fibrillation (Hcc) Your diagnoses also included:  A-Fib (Hcc) Follow-up Information Follow up With Details Comments Contact Info Steph Briones MD Schedule an appointment as soon as possible for a visit  [phone] [facility] [phone] Moni Arias MD On 10/18/2018 1;15pm  Cardiology follow-up [address] [place] [address] 
[phone] Your Scheduled Appointments Thursday October 18, 2018  1:15 PM EDT  
ESTABLISHED PATIENT with Trent Doty NP [facility] [address] [place] [address]) [address]  
[phone] Discharge Orders None A check margarito indicates which time of day the medication should be taken. My Medications CONTINUE taking these medications Instructions Each Dose to Equal  
 Morning Noon Evening Bedtime  
 aspirin delayed-release 81 mg tablet Your last dose was: Your next dose is: Take  by mouth daily. atenolol 25 mg tablet Commonly known as:  TENORMIN Your last dose was: Your next dose is: TAKE ONE-HALF (1/2) TABLET NIGHTLY FLONASE 50 mcg/actuation nasal spray Generic drug:  fluticasone Your last dose was: Your next dose is:    
   
   
 by Nasal route as needed. methIMAzole 5 mg tablet Commonly known as:  TAPAZOLE Your last dose was: Your next dose is: Take 2.5 mg by mouth every seven (7) days. Indications: Monday Only 2.5 mg  
    
   
   
   
  
 OTHER Your last dose was: Your next dose is:    
   
   
 Calcium,magnesium ,zinc & vitamin d3 3 tablets daily VITAMIN B-12 1,000 mcg tablet Generic drug:  cyanocobalamin Your last dose was: Your next dose is: Take 1,000 mcg by mouth daily. Indications: nightly 1000 mcg XARELTO 20 mg Tab tablet Generic drug:  rivaroxaban Your last dose was: Your next dose is: TAKE 1 TABLET DAILY WITH DINNER Discharge Instructions 2 [address], [place], [address] [facility]  [phone] ATRIAL FIBRILLATION ABLATION DISCHARGE INSTRUCTIONS Patient ID: Servando Robertson [id number] 
68 y.o. 
1945 Admit Date: 10/3/2018 Discharge Date: 10/3/2018 Admitting Physician: Forest Harrell MD  
 
Discharge Physician: Forest Harrell MD 
 
Admission Diagnoses:  
a fib PVI Discharge Diagnoses:  
Principal Problem: 
  Paroxysmal atrial fibrillation (HCC) () Discharge Condition: Good Cardiology Procedures this Admission:  Atrial fibrillation. Disposition: home Reference discharge instructions provided by nursing for diet and activity. Follow-up with Dr Ran Mckenzie or his Nurse Practitioners in 1-2 weeks. Call [phone] to make an appointment. Signed: 
BECKIE Wilkerson MD, [facility] 
10/3/2018 11:43 AM 
 
S/P ATRIAL FIBRILLATION ABLATION DISCHARGE INSTRUCTIONS It is normal to feel tired the first couple days. Take it easy and follow the physicians instructions. CHECK THE CATHETER INSERTION SITE DAILY: 
You may shower 24 hours after the procedure, remove the bandage during showering. Wash with soap and water and pat dry.  
Gentle cleaning of the site with soap and water is sufficient, cover with a dry clean dressing or bandage. Do not apply creams or powders to the area. Do not sit in a bathtub or pool of water for 7 days or until wound has completely healed. Temporary bruising and discomfort is normal and may last a few weeks. You may have a  formation of a small lump at the site which may last up to 6 weeks. CALL THE PHYSICIAN: If the site becomes red, swollen or feels warm to the touch If there is bleeding or drainage or if there is unusual pain at the groin or down the leg. If there is any bleeding, lie down, apply pressure or have someone apply pressure with a clean cloth until the bleeding stops. If the bleeding continues, call 911 to be transported to the hospital. 
DO  [place] [address]. Activity: For the first 24-48 hours or as instructed by the physician: No lifting, pushing or pulling over 5 pounds and no straining the insertion site. Do not life grocery bags or the garbage can, do not run the vacuum  or  for 7 days. Start with short walks as in the hospital and gradually increase as tolerated each day. It is recommended to walk 30 minutes 5-7 days per week. Follow your physicians instructions on activity. Avoid walking outside in extremes of heat or cold. Walk inside when it is cold and windy or hot and humid. Things to keep in mind: 
No driving for at least 5 days or as designated by your physician. Limit the number of times you go up and down the stairs Take rests and pace yourself with activity. Be careful and do not strain with bowel movements. Medications: Take all medications as prescribed Call your physician if you have any questions Keep an updated list of your medications with you at all times and give a list to your physician and pharmacist 
 
Signs and Symptoms: 
Be cautious of symptoms of angina or recurrent symptoms such as chest discomfort, unusual shortness of breath or fatigue, palpitations. After Care: Follow up with your physician as instructed. Follow a heart healthy diet with proper portion control, daily stress management, daily exercise, blood pressure and cholesterol control , and smoking cessation. Introducing [facility]! Sarah Wong introduces Tripvisto patient portal. Now you can access parts of your medical record, email your doctor's office, and request medication refills online. 1. In your internet browser, go to [email or web address] 2. Click on the First Time User? Click Here link in the Sign In box. You will see the New Member Sign Up page. 3. Enter your Tripvisto Access Code exactly as it appears below. You will not need to use this code after youve completed the sign-up process. If you do not sign up before the expiration date, you must request a new code. · Tripvisto Access Code: [email or web address] Expires: 11/11/2018 10:22 AM 
 
4. Enter the last four digits of your Social Security Number (xxxx) and Date of Birth (mm/dd/yyyy) as indicated and click Submit. You will be taken to the next sign-up page. 5. Create a Tripvisto ID. This will be your Tripvisto login ID and cannot be changed, so think of one that is secure and easy to remember. 6. Create a Tripvisto password. You can change your password at any time. 7. Enter your Password Reset Question and Answer. This can be used at a later time if you forget your password. 8. Enter your e-mail address. You will receive e-mail notification when new information is available in [address] 9. Click Sign Up. You can now view and download portions of your medical record. 10. Click the Download Summary menu link to download a portable copy of your medical information. If you have questions, please visit the Frequently Asked Questions section of the Tripvisto website.  Remember, Tripvisto is NOT to be used for urgent needs. For medical emergencies, dial 911. Now available from your iPhone and Android! Introducing Jeff Arnold As a [facility] patient, I wanted to make you aware of our electronic visit tool called Jeff Arnold. [facility] ShipServ/7 allows you to connect within minutes with a medical provider 24 hours a day, seven days a week via a mobile device or tablet or logging into a secure website from your computer. You can access Jeff Arnold from anywhere in the [place]. A virtual visit might be right for you when you have a simple condition and feel like you just dont want to get out of bed, or cant get away from work for an appointment, when your regular [facility] provider is not available (evenings, weekends or holidays), or when youre out of town and need minor care. Electronic visits cost only $49 and if the [facility] [employer]Serv/7 provider determines a prescription is needed to treat your condition, one can be electronically transmitted to a nearby pharmacy*. Please take a moment to enroll today if you have not already done so. The enrollment process is free and takes just a few minutes. To enroll, please download the [employer]/7 lucinda to your tablet or phone, or visit [email or web address] to enroll on your computer. And, as an [address] [place] [address] patient with a [employer] account, the results of your visits will be scanned into your electronic medical record and your primary care provider will be able to view the scanned results. We urge you to continue to see your regular [facility] provider for your ongoing medical care. And while your primary care provider may not be the one available when you seek a Jeff Arnold virtual visit, the peace of mind you get from getting a real diagnosis real time can be priceless. For more information on Jeff Arnold, view our Frequently Asked Questions (FAQs) at [email or web address].  
 
Sincerely, 
 
 Antwan Waggoner MD 
Chief Medical Officer 50Nadia Booth *:  certain medications cannot be prescribed via Jeff Arnold Providers Seen During Your Hospitalization Provider Specialty Primary office phone Aamir Soto MD Cardiology [phone] Immunizations Administered for This Admission Name Date Influenza Vaccine (Quad) PF 10/4/2018 Your Primary Care Physician (PCP) Primary Care Physician Office Phone Office Fax Rosie Huang [phone] You are allergic to the following No active allergies Recent Documentation Height Weight BMI Smoking Status 1.829 m 66.2 kg 19.8 kg/m2 Former Smoker Emergency Contacts Name Discharge Info Relation Home Work Mobile Yeny Belle DISCHARGE CAREGIVER [3] Spouse [3] [phone] Patient Belongings The following personal items are in your possession at time of discharge: 
  Dental Appliances: None         Home Medications: None   Jewelry: None  Clothing: At bedside    Other Valuables: None Please provide this summary of care documentation to your next provider. Signatures-by signing, you are acknowledging that this After Visit Summary has been reviewed with you and you have received a copy. Patient Signature:  ____________________________________________________________ Date:  ____________________________________________________________  
  
[facility] Provider Signature:  ____________________________________________________________ Date:  ____________________________________________________________

## 2018-10-03 NOTE — DISCHARGE INSTRUCTIONS
[address] [place] [address]  [phone]        ATRIAL FIBRILLATION ABLATION DISCHARGE INSTRUCTIONS    Patient ID:  Letty Dowell  [id number]  81 y.o.  1945    Admit Date: 10/3/2018    Discharge Date: 10/3/2018     Admitting Physician: Saji Aaron MD     Discharge Physician: Saji Aaron MD    Admission Diagnoses:   a fib  PVI    Discharge Diagnoses:   Principal Problem:    Paroxysmal atrial fibrillation [facility]) ()        Discharge Condition: Good    Cardiology Procedures this Admission:  Atrial fibrillation. Disposition: home    Reference discharge instructions provided by nursing for diet and activity. Follow-up with Dr Chrissy Rahman or his Nurse Practitioners in 1-2 weeks. Call [phone] to make an appointment. Signed:  BECKIE Bush MD, [facility]  10/3/2018  11:43 AM    S/P ATRIAL FIBRILLATION ABLATION DISCHARGE INSTRUCTIONS    It is normal to feel tired the first couple days. Take it easy and follow the physicians instructions. CHECK THE CATHETER INSERTION SITE DAILY:  You may shower 24 hours after the procedure, remove the bandage during showering. Wash with soap and water and pat dry. Gentle cleaning of the site with soap and water is sufficient, cover with a dry clean dressing or bandage. Do not apply creams or powders to the area. Do not sit in a bathtub or pool of water for 7 days or until wound has completely healed. Temporary bruising and discomfort is normal and may last a few weeks. You may have a  formation of a small lump at the site which may last up to 6 weeks. CALL THE PHYSICIAN:  If the site becomes red, swollen or feels warm to the touch  If there is bleeding or drainage or if there is unusual pain at the groin or down the leg. If there is any bleeding, lie down, apply pressure or have someone apply pressure with a clean cloth until the bleeding stops.   If the bleeding continues, call [phone] to be transported to the hospital.  DO NOT DRIVE YOURSELF, OR HAVE ANYONE ELSE DRIVE YOU - CALL [phone]. Activity:      For the first 24-48 hours or as instructed by the physician:  No lifting, pushing or pulling over 5 pounds and no straining the insertion site. Do not life grocery bags or the garbage can, do not run the vacuum  or  for 7 days. Start with short walks as in the hospital and gradually increase as tolerated each day. It is recommended to walk 30 minutes 5-7 days per week. Follow your physicians instructions on activity. Avoid walking outside in extremes of heat or cold. Walk inside when it is cold and windy or hot and humid. Things to keep in mind:  No driving for at least 5 days or as designated by your physician. Limit the number of times you go up and down the stairs  Take rests and pace yourself with activity. Be careful and do not strain with bowel movements. Medications: Take all medications as prescribed  Call your physician if you have any questions  Keep an updated list of your medications with you at all times and give a list to your physician and pharmacist    Signs and Symptoms:  Be cautious of symptoms of angina or recurrent symptoms such as chest discomfort, unusual shortness of breath or fatigue, palpitations. After Care: Follow up with your physician as instructed. Follow a heart healthy diet with proper portion control, daily stress management, daily exercise, blood pressure and cholesterol control , and smoking cessation.

## 2018-10-03 NOTE — H&P (VIEW-ONLY)
Subjective: Melia Mcgraw is a 81 y.o. male is here for EP consult. He presented to [facility] with AF with rvr and syncope. The patient denies chest pain/ shortness of breath, orthopnea, PND, LE edema, palpitations, syncope, presyncope or fatigue. Patient Active Problem List  
 Diagnosis Date Noted  Syncope 08/22/2018  Dilated aortic root ([address] 12/14/2016  Sinus bradycardia 12/14/2016  Carotid stenosis, bilateral   
 Paroxysmal atrial fibrillation (HCC)  Hyperthyroidism Annetta Aaron MD 
Past Medical History:  
Diagnosis Date  Carotid stenosis, bilateral   
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  
Procedure Laterality Date  ECHO 2D ADULT  8/2011 EF 60%, Trace AI, PA 41 No Known Allergies Family History Problem Relation Age of Onset  No Known Problems Mother  No Known Problems Father   
 negative for cardiac disease Social History Social History  Marital status:  Spouse name: N/A  
 Number of children: N/A  
 Years of education: N/A Social History Main Topics  Smoking status: Former Smoker Types: Cigarettes Quit date: 6/5/1970  Smokeless tobacco: Never Used  Alcohol use None  Drug use: None  Sexual activity: Not Asked Other Topics Concern  None Social History Narrative Current Outpatient Prescriptions Medication Sig  
 OTHER Calcium,magnesium ,zinc & vitamin d3 3 tablets daily  methIMAzole (TAPAZOLE) 5 mg tablet Take 2.5 mg by mouth every seven (7) days. Indications: Monday Only  atenolol (TENORMIN) 25 mg tablet TAKE ONE-HALF (1/2) TABLET NIGHTLY  XARELTO 20 mg tab tablet TAKE 1 TABLET DAILY WITH DINNER  cyanocobalamin (VITAMIN B-12) 1,000 mcg tablet Take 1,000 mcg by mouth daily. Indications: nightly  aspirin delayed-release 81 mg tablet Take  by mouth daily.  fluticasone (FLONASE) 50 mcg/Actuation nasal spray by Nasal route as needed. No current facility-administered medications for this visit. Vitals:  
 09/11/18 1341 09/11/18 1342 BP: 120/60 130/60 Pulse: 60 Resp: 16 SpO2: 98% Weight: 148 lb 12.8 oz (67.5 kg) Height: 6' (1.829 m) I have reviewed the nurses notes, vitals, problem list, allergy list, medical history, family, social history and medications. Review of Symptoms: 
 
General: Pt denies excessive weight gain or loss. Pt is able to conduct ADL's HEENT: Denies blurred vision, headaches, epistaxis and difficulty swallowing. Respiratory: Denies shortness of breath, DONOHUE, wheezing or stridor. Cardiovascular: +palpitations, Denies precordial pain, edema or PND Gastrointestinal: Denies poor appetite, indigestion, abdominal pain or blood in stool Urinary: Denies dysuria, pyuria Musculoskeletal: Denies pain or swelling from muscles or joints Neurologic: Denies tremor, paresthesias, or sensory motor disturbance Skin: Denies rash, itching or texture change. Psych: Denies depression Physical Exam:   
 
General: Well developed, in no acute distress. HEENT: Eyes - PERRL, no jvd Heart:  Normal S1/S2 negative S3 or S4. Regular, no murmur, gallop or rub.  
Respiratory: Clear bilaterally x 4, no wheezing or rales Abdomen:   Soft, non-tender, bowel sounds are active.  
Extremities:  No edema, normal cap refill, no cyanosis. Musculoskeletal: No clubbing Neuro: A&Ox3, speech clear, gait stable. Skin: Skin color is normal. No rashes or lesions. Non diaphoretic Vascular: 2+ pulses symmetric in all extremities Cardiographics Ekg: nsr 
 
ekg at [facility] - AF with rvr Results for orders placed or performed during the hospital encounter of 08/22/18 EKG, 12 LEAD, INITIAL Result Value Ref Range Ventricular Rate 81 BPM  
 Atrial Rate 340 BPM  
 QRS Duration 82 ms Q-T Interval 360 ms QTC Calculation (Bezet) 418 ms Calculated R Axis 84 degrees Calculated T Axis 61 degrees Diagnosis Atrial fibrillation Low voltage QRS Abnormal ECG No previous ECGs available Confirmed by Abbi Cerda MD, --- ([id number]) on 8/23/2018 12:06:42 AM 
  
Results for orders placed or performed in visit on 05/12/14 AMB POC EKG ROUTINE W/ 12 LEADS, INTER & REP Narrative Yusra Burch Lab Results Component Value Date/Time WBC 7.5 08/23/2018 06:13 AM  
 HGB 12.7 08/23/2018 06:13 AM  
 HCT 36.5 (L) 08/23/2018 06:13 AM  
 PLATELET 047 32/73/6372 06:13 AM  
 MCV 94.6 08/23/2018 06:13 AM  
  
Lab Results Component Value Date/Time Sodium 140 08/23/2018 06:13 AM  
 Potassium 3.8 08/23/2018 06:13 AM  
 Chloride 106 08/23/2018 06:13 AM  
 CO2 29 08/23/2018 06:13 AM  
 Anion gap 5 08/23/2018 06:13 AM  
 Glucose 96 08/23/2018 06:13 AM  
 BUN 6 08/23/2018 06:13 AM  
 Creatinine 0.63 (L) 08/23/2018 06:13 AM  
 BUN/Creatinine ratio 10 (L) 08/23/2018 06:13 AM  
 GFR est AA >60 08/23/2018 06:13 AM  
 GFR est non-AA >60 08/23/2018 06:13 AM  
 Calcium 8.6 08/23/2018 06:13 AM  
 Bilirubin, total 1.4 (H) 08/22/2018 07:33 AM  
 AST (SGOT) 18 08/22/2018 07:33 AM  
 Alk. phosphatase 61 08/22/2018 07:33 AM  
 Protein, total 7.2 08/22/2018 07:33 AM  
 Albumin 3.8 08/22/2018 07:33 AM  
 Globulin 3.4 08/22/2018 07:33 AM  
 A-G Ratio 1.1 08/22/2018 07:33 AM  
 ALT (SGPT) 21 08/22/2018 07:33 AM  
  
 
 Assessment: 
 
 Assessment: ICD-10-CM ICD-9-CM 1. Paroxysmal atrial fibrillation (HCC) I48.0 427.31 OTHER  
   CBC WITH AUTOMATED DIFF PROTHROMBIN TIME + INR  
   METABOLIC PANEL, COMPREHENSIVE  
   XR CHEST PA LAT  
   CTA CHEST W OR W WO CONT 2. Hyperthyroidism E05.90 242.90 OTHER  
   CBC WITH AUTOMATED DIFF PROTHROMBIN TIME + INR  
   METABOLIC PANEL, COMPREHENSIVE  
   XR CHEST PA LAT  
   CTA CHEST W OR W WO CONT 3. Sinus bradycardia R00.1 427.89 OTHER  
   CBC WITH AUTOMATED DIFF    PROTHROMBIN TIME + INR  
 METABOLIC PANEL, COMPREHENSIVE  
   XR CHEST PA LAT  
   CTA CHEST W OR W WO CONT Orders Placed This Encounter  XR CHEST PA LAT Standing Status:   Future Standing Expiration Date:   10/11/2019 Order Specific Question:   Reason for Exam  
  Answer:   pre op Order Specific Question:   Is Patient Allergic to Contrast Dye? Answer:   No  
 CTA CHEST W OR W WO CONT Standing Status:   Future Standing Expiration Date:   10/11/2019 Order Specific Question:   Is Patient Allergic to Contrast Dye? Answer:   No  
  Order Specific Question:   STAT Creatinine as indicated Answer: Yes  CBC WITH AUTOMATED DIFF  
 PROTHROMBIN TIME + INR  METABOLIC PANEL, COMPREHENSIVE  
 OTHER Sig: Calcium,magnesium ,zinc & vitamin d3 3 tablets daily Plan:  
Mr Milan Pearl is a pleasant gentleman with symptomatic PAF. He is a candidate for an afib ablation/ILR. I discussed the risks/benefits/alternatives of the procedure with the patient. Risks include (but are not limited to) bleeding, heart block, infection, cva/mi/tamponade/esophageal perforation/pv stenosis/death. The patient understands that there is a 4-0% major complication rate and agrees to proceed. Thank you for this interesting consultation. Continue medical management for AF. Thank you for allowing me to participate in [facility].  
 
Patrizia Tellez MD, [facility]

## 2018-10-03 NOTE — IP AVS SNAPSHOT
[address] [facility] 
[phone] Patient: Filomena Schaefer MRN: [id number] MZV:99/01/1530 A check margarito indicates which time of day the medication should be taken. My Medications CONTINUE taking these medications Instructions Each Dose to Equal  
 Morning Noon Evening Bedtime  
 aspirin delayed-release 81 mg tablet Your last dose was: Your next dose is: Take  by mouth daily. atenolol 25 mg tablet Commonly known as:  TENORMIN Your last dose was: Your next dose is: TAKE ONE-HALF (1/2) TABLET NIGHTLY FLONASE 50 mcg/actuation nasal spray Generic drug:  fluticasone Your last dose was: Your next dose is:    
   
   
 by Nasal route as needed. methIMAzole 5 mg tablet Commonly known as:  TAPAZOLE Your last dose was: Your next dose is: Take 2.5 mg by mouth every seven (7) days. Indications: Monday Only 2.5 mg  
    
   
   
   
  
 OTHER Your last dose was: Your next dose is:    
   
   
 Calcium,magnesium ,zinc & vitamin d3 3 tablets daily VITAMIN B-12 1,000 mcg tablet Generic drug:  cyanocobalamin Your last dose was: Your next dose is: Take 1,000 mcg by mouth daily. Indications: nightly 1000 mcg XARELTO 20 mg Tab tablet Generic drug:  rivaroxaban Your last dose was: Your next dose is:  TAKE 1 TABLET DAILY WITH DINNER

## 2018-10-03 NOTE — INTERVAL H&P NOTE
H&P Update: 
Seward Dubin was seen and examined. History and physical has been reviewed. The patient has been examined.  There have been no significant clinical changes since the completion of the originally dated History and Physical. 
 
Signed By: Jaida Martínez MD   
 October 3, 2018 9:11 AM

## 2018-10-03 NOTE — PERIOP NOTES
TRANSFER - OUT REPORT: 
 
Verbal report given to RISHI Dash(name) on [facility]  being transferred to [facility](unit) for routine progression of care Report consisted of patients Situation, Background, Assessment and  
Recommendations(SBAR). Information from the following report(s) SBAR, OR Summary, Procedure Summary, Intake/Output and MAR was reviewed with the receiving nurse. Opportunity for questions and clarification was provided. Patient transported with: 
 Monitor Registered Nurse

## 2018-10-03 NOTE — ANESTHESIA POSTPROCEDURE EVALUATION
Post-Anesthesia Evaluation and Assessment Patient: Rodrigo Collins MRN: [id number]  SSN: [id number] YOB: 1945  Age: 81 y.o. Sex: male Cardiovascular Function/Vital Signs Visit Vitals  /63 (BP 1 Location: Right arm, BP Patient Position: At rest)  Pulse 62  Temp 37 °C (98.6 °F)  Resp 13  Ht 6' (1.829 m)  Wt 65.8 kg (145 lb)  SpO2 98%  BMI 19.67 kg/m2 Patient is status post general anesthesia for * No procedures listed *. Nausea/Vomiting: None Postoperative hydration reviewed and adequate. Pain: 
Pain Scale 1: Numeric (0 - 10) (10/03/18 1337) Pain Intensity 1: 0 (10/03/18 1337) Managed Neurological Status:  
Neuro (WDL): Within Defined Limits (10/03/18 1337) Neuro Neurologic State: Alert (10/03/18 1337) Orientation Level: Oriented to person;Oriented to place;Oriented to situation (10/03/18 1245) Cognition: Follows commands (10/03/18 1245) Speech: Clear (10/03/18 1245) LUE Motor Response: Purposeful (10/03/18 1337) LLE Motor Response: Purposeful (10/03/18 1337) RUE Motor Response: Purposeful (10/03/18 1337) RLE Motor Response: Purposeful (10/03/18 1337) At baseline Mental Status and Level of Consciousness: Arousable Pulmonary Status:  
O2 Device: Room air (10/03/18 1337) Adequate oxygenation and airway patent Complications related to anesthesia: None Post-anesthesia assessment completed. No concerns Signed By: Eda Wright MD   
 October 3, 2018

## 2018-10-04 VITALS
WEIGHT: 146 LBS | DIASTOLIC BLOOD PRESSURE: 59 MMHG | RESPIRATION RATE: 16 BRPM | BODY MASS INDEX: 19.77 KG/M2 | HEIGHT: 72 IN | OXYGEN SATURATION: 96 % | TEMPERATURE: 98.3 F | SYSTOLIC BLOOD PRESSURE: 104 MMHG | HEART RATE: 67 BPM

## 2018-10-04 LAB
ATRIAL RATE: 62 BPM
CALCULATED P AXIS, ECG09: 44 DEGREES
CALCULATED R AXIS, ECG10: 42 DEGREES
CALCULATED T AXIS, ECG11: 50 DEGREES
DIAGNOSIS, 93000: NORMAL
P-R INTERVAL, ECG05: 154 MS
Q-T INTERVAL, ECG07: 408 MS
QRS DURATION, ECG06: 88 MS
QTC CALCULATION (BEZET), ECG08: 414 MS
VENTRICULAR RATE, ECG03: 62 BPM

## 2018-10-04 PROCEDURE — 74011250636 HC RX REV CODE- 250/636: Performed by: INTERNAL MEDICINE

## 2018-10-04 PROCEDURE — 90686 IIV4 VACC NO PRSV 0.5 ML IM: CPT | Performed by: INTERNAL MEDICINE

## 2018-10-04 PROCEDURE — 90471 IMMUNIZATION ADMIN: CPT

## 2018-10-04 PROCEDURE — 99218 HC RM OBSERVATION: CPT

## 2018-10-04 PROCEDURE — 93005 ELECTROCARDIOGRAM TRACING: CPT

## 2018-10-04 RX ADMIN — Medication 10 ML: at 06:22

## 2018-10-04 RX ADMIN — INFLUENZA VIRUS VACCINE 0.5 ML: 15; 15; 15; 15 SUSPENSION INTRAMUSCULAR at 08:54

## 2018-10-04 NOTE — PROGRESS NOTES
Bedside shift change report given to Pavan Ortega RN (oncoming nurse) by Coty Sanchez RN (offgoing nurse). Report included the following information SBAR, Kardex, Procedure Summary, Intake/Output, MAR, Accordion, Recent Results, Med Rec Status, Cardiac Rhythm NSR, Alarm Parameters , Pre Procedure Checklist, Procedure Verification and Quality Measures.

## 2018-10-04 NOTE — PROGRESS NOTES
Problem: Falls - Risk of 
Goal: *Absence of Falls Document Debria Check Fall Risk and appropriate interventions in the flowsheet. Fall Risk Interventions: 
  
 
  
 
Medication Interventions: Assess postural VS orthostatic hypotension, Evaluate medications/consider consulting pharmacy, Patient to call before getting OOB, Teach patient to arise slowly Problem: Pressure Injury - Risk of 
Goal: *Prevention of pressure injury Document Srinath Scale and appropriate interventions in the flowsheet. Pressure Injury Interventions:

## 2018-10-04 NOTE — PROGRESS NOTES
Cardiac Electrophysiology Progress Note [address], [place], [address] [facility]  [phone] 
 
10/4/2018 8:13 AM 
 
Admit Date: 10/3/2018 Admit Diagnosis: a fib PVI A-fib ([address] Subjective: Elizabeth Whyte   denies chest pain, chest pressure/discomfort, dyspnea, palpitations, lower extremity edema. Visit Vitals  /59 (BP 1 Location: Right arm, BP Patient Position: At rest;Sitting)  Pulse 67  Temp 98.3 °F (36.8 °C)  Resp 16  
 Ht 6' (1.829 m)  Wt 146 lb (66.2 kg)  SpO2 96%  BMI 19.8 kg/m2 Current Facility-Administered Medications Medication Dose Route Frequency  influenza vaccine 2018-19 (6 mos+)(PF) (FLUARIX QUAD/FLULAVAL QUAD) injection 0.5 mL  0.5 mL IntraMUSCular PRIOR TO DISCHARGE  DOBUTamine (DOBUTREX) 2,000 mcg/ml infusion  0-10 mcg/kg/min IntraVENous TITRATE  fentaNYL citrate (PF) injection 12.5-50 mcg  12.5-50 mcg IntraVENous Multiple  midazolam (VERSED) injection 1-5 mg  1-5 mg IntraVENous Multiple  protamine injection  mg   mg IntraVENous Multiple  sodium chloride (NS) flush 5-10 mL  5-10 mL IntraVENous Q8H  
 sodium chloride (NS) flush 5-10 mL  5-10 mL IntraVENous PRN  
 acetaminophen (TYLENOL) tablet 650 mg  650 mg Oral Q4H PRN  
 HYDROcodone-acetaminophen (NORCO) 5-325 mg per tablet 1 Tab  1 Tab Oral Q4H PRN Objective:  
  
Visit Vitals  /59 (BP 1 Location: Right arm, BP Patient Position: At rest;Sitting)  Pulse 67  Temp 98.3 °F (36.8 °C)  Resp 16  
 Ht 6' (1.829 m)  Wt 146 lb (66.2 kg)  SpO2 96%  BMI 19.8 kg/m2 Physical Exam: Abdomen: soft, non-tender Extremities: extremities normal 
Heart: regular rate and rhythm Lungs: clear to auscultation bilaterally Pulses: 2+ and symmetric Data Review:  
Labs:   
No results for input(s): WBC, HGB, HCT, PLT, HGBEXT, HCTEXT, PLTEXT in the last 72 hours. No results for input(s): NA, K, CL, CO2, GLU, BUN, CREA, CA, MG, PHOS, ALB, TBIL, TBILI, SGOT, ALT, INR in the last 72 hours. No lab exists for component:  BNP, INREXT No results for input(s): TROIQ, CPK, CKMB in the last 72 hours. Intake/Output Summary (Last 24 hours) at 10/04/18 0813 Last data filed at 10/04/18 9403 Gross per 24 hour Intake             2560 ml Output              485 ml Net             2075 ml Telemetry: SR 64 Assessment:  
 
Principal Problem: 
  Paroxysmal atrial fibrillation (HCC) () Active Problems: 
  A-fib ([address] (10/3/2018) Plan:  
 
Seward Dubin is S/P PVI of all 4 PVs, cardioversion to sinus rhythm 10/3/18. He remains in sinus rhythm, bilat groin dressings dry and intact. Continue with current medications and follow up in our office in 2-3 weeks. BECKIE Babb Patient seen and examined by me with nurse practitioner. I personally performed all components of the history, physical, and medical decision making and agree with the assessment and plan with minor modifications as noted. Sp af ablation. In sinus rhythm. Cont oac. [id number] Thu Rizvi for [employer]. F/u in 1-2 weeks Jaida Martínez MD, [facility] 
 
 
 
10/4/2018 
8:13 AM

## 2018-10-04 NOTE — PROGRESS NOTES
2331 - Pt. Attempted to urinate in urinal, reported feeling clammy and diaphoretic. Pt. Report no SOB, dizziness. HR: 53, BP: 94/47. Bilateral groin sites no bleeding, no hematoma. 2335: HR: 54, BP: 99/49 
 
2340: HR: 57, BP: 102/47 2345 - HR: 62, BP: 112/54. Pt. Report feeling good now. 2350 - HR: 59, BP: 106/54 
 
2355 - HR: 64, BP: 115/57, RN will continue closely monitor pt.

## 2018-10-09 ENCOUNTER — TELEPHONE (OUTPATIENT)
Dept: CARDIOLOGY CLINIC | Age: 73
End: 2018-10-09

## 2018-10-09 NOTE — TELEPHONE ENCOUNTER
Pt called stating that he had \"gone into a-Fib\" - \"rapid and irregular\" - but went back into normal rhythm while talking on the phone with undersigned. Pt stated that he had an ablation last week w/Dr Renetta Hoffman.    Pt is scheduled for f/u w/L Kaci Petty 10/18/18. Dr Roddy Lemus' nurse advised that message be sent to [address] [place] [address],[address]. Pt requesting c/b.

## 2018-10-09 NOTE — TELEPHONE ENCOUNTER
Spoke with patient. He reports a fib episode lasted 5 minutes and resolved with no return. Advised him that it is not unusual for some short episodes post ablation and he should keep his appointment with Juan Manuel Snow for next week. He will let us know if any longer episodes.

## 2018-10-18 ENCOUNTER — OFFICE VISIT (OUTPATIENT)
Dept: CARDIOLOGY CLINIC | Age: 73
End: 2018-10-18

## 2018-10-18 VITALS
RESPIRATION RATE: 16 BRPM | OXYGEN SATURATION: 97 % | SYSTOLIC BLOOD PRESSURE: 118 MMHG | WEIGHT: 146.6 LBS | DIASTOLIC BLOOD PRESSURE: 64 MMHG | HEIGHT: 72 IN | BODY MASS INDEX: 19.86 KG/M2 | HEART RATE: 56 BPM

## 2018-10-18 DIAGNOSIS — Z98.890 S/P ABLATION OF ATRIAL FIBRILLATION: ICD-10-CM

## 2018-10-18 DIAGNOSIS — E05.90 HYPERTHYROIDISM: ICD-10-CM

## 2018-10-18 DIAGNOSIS — Z86.79 S/P ABLATION OF ATRIAL FIBRILLATION: ICD-10-CM

## 2018-10-18 DIAGNOSIS — I48.91 ATRIAL FIBRILLATION STATUS POST CARDIOVERSION (HCC): ICD-10-CM

## 2018-10-18 DIAGNOSIS — I48.0 PAROXYSMAL ATRIAL FIBRILLATION (HCC): Primary | ICD-10-CM

## 2018-10-18 NOTE — PROGRESS NOTES
Subjective: Edouard Torrez is a 81 y.o. male is here for follow up s/p AF ablation. He's been doing well, believes he's had a few brief episodes of breakthrough AF but none since 10/10/2018. The patient denies chest pain/ shortness of breath, orthopnea, PND, LE edema, palpitations, syncope, presyncope or fatigue.        Patient Active Problem List    Diagnosis Date Noted    A-fib [facility]) 10/03/2018    Syncope 2018    Dilated aortic root ([address] 2016    Sinus bradycardia 2016    Carotid stenosis, bilateral     Paroxysmal atrial fibrillation (HCC)     Hyperthyroidism       Eve Da Silva MD  Past Medical History:   Diagnosis Date    Carotid stenosis, bilateral     Hollenhorst plaque, left eye 2012    Hyperthyroidism     Paroxysmal atrial fibrillation (HCC)       Past Surgical History:   Procedure Laterality Date    ECHO 2D ADULT  2011    EF 60%, Trace ALVARO BRIDGES [address]     No Known Allergies   Family History   Problem Relation Age of Onset    No Known Problems Mother     No Known Problems Father     negative for cardiac disease  Social History     Socioeconomic History    Marital status:      Spouse name: Not on file    Number of children: Not on file    Years of education: Not on file    Highest education level: Not on file   Social Needs    Financial resource strain: Not on file    Food insecurity - worry: Not on file    Food insecurity - inability: Not on file   [employer] needs - medical: Not on file   [employer] needs - non-medical: Not on file   Occupational History    Not on file   Tobacco Use    Smoking status: Former Smoker     Types: Cigarettes     Last attempt to quit: 1970     Years since quittin.4    Smokeless tobacco: Never Used   Substance and Sexual Activity    Alcohol use: Not on file    Drug use: Not on file    Sexual activity: Not on file   Other Topics Concern    Not on file   Social History Narrative    Not on file     Current Outpatient Medications   Medication Sig    OTHER Calcium,magnesium ,zinc & vitamin d3 3 tablets daily    methIMAzole (TAPAZOLE) 5 mg tablet Take 2.5 mg by mouth every seven (7) days. Indications: Monday Only    atenolol (TENORMIN) 25 mg tablet TAKE ONE-HALF (1/2) TABLET NIGHTLY    XARELTO 20 mg tab tablet TAKE 1 TABLET DAILY WITH DINNER    cyanocobalamin (VITAMIN B-12) 1,000 mcg tablet Take 1,000 mcg by mouth daily. Indications: nightly    aspirin delayed-release 81 mg tablet Take  by mouth daily.  fluticasone (FLONASE) 50 mcg/Actuation nasal spray by Nasal route as needed. No current facility-administered medications for this visit. Vitals:    10/18/18 1310   BP: 118/64   Pulse: (!) 56   Resp: 16   SpO2: 97%   Weight: 146 lb 9.6 oz (66.5 kg)   Height: 6' (1.829 m)       I have reviewed the nurses notes, vitals, problem list, allergy list, medical history, family, social history and medications. Review of Symptoms:    General: Pt denies excessive weight gain or loss. Pt is able to conduct ADL's  HEENT: Denies blurred vision, headaches, epistaxis and difficulty swallowing. Respiratory: Denies shortness of breath, DONOHUE, wheezing or stridor. Cardiovascular: Denies precordial pain, palpitations, edema or PND  Gastrointestinal: Denies poor appetite, indigestion, abdominal pain or blood in stool  Urinary: Denies dysuria, pyuria  Musculoskeletal: Denies pain or swelling from muscles or joints  Neurologic: Denies tremor, paresthesias, or sensory motor disturbance  Skin: Denies rash, itching or texture change. Psych: Denies depression      Physical Exam:      General: Well developed, in no acute distress. HEENT: Eyes - PERRL, no jvd  Heart:  Normal S1/S2 negative S3 or S4.  Regular, no murmur, gallop or rub.   Respiratory: Clear bilaterally x 4, no wheezing or rales  Abdomen:   Soft, non-tender, bowel sounds are active.   Extremities:  No edema, normal cap refill, no cyanosis. Musculoskeletal: No clubbing  Neuro: A&Ox3, speech clear, gait stable. Skin: Skin color is normal. No rashes or lesions. Non diaphoretic  Vascular: 2+ pulses symmetric in all extremities    Cardiographics    Ekg: sinus bradycardia    Results for orders placed or performed during the hospital encounter of 10/03/18   EKG, 12 LEAD, INITIAL   Result Value Ref Range    Ventricular Rate 62 BPM    Atrial Rate 62 BPM    P-R Interval 154 ms    QRS Duration 88 ms    Q-T Interval 408 ms    QTC Calculation (Bezet) 414 ms    Calculated P Axis 44 degrees    Calculated R Axis 42 degrees    Calculated T Axis 50 degrees    Diagnosis       Normal sinus rhythm  Early repolarization  When compared with ECG of 22-AUG-2018 07:44,  No significant change was found  Confirmed by Minus Hemp ([id number]) on 10/4/2018 8:42:13 AM     Results for orders placed or performed in visit on 05/12/14   AMB POC EKG ROUTINE W/ 12 LEADS, INTER & REP    Narrative    Loni Souza             Lab Results   Component Value Date/Time    WBC 5.1 09/24/2018 08:47 AM    HGB 12.3 (L) 09/24/2018 08:47 AM    HCT 35.9 (L) 09/24/2018 08:47 AM    PLATELET 569 85/13/3565 08:47 AM    MCV 96 09/24/2018 08:47 AM      Lab Results   Component Value Date/Time    Sodium 140 09/24/2018 08:47 AM    Potassium 4.8 09/24/2018 08:47 AM    Chloride 100 09/24/2018 08:47 AM    CO2 28 09/24/2018 08:47 AM    Anion gap 5 08/23/2018 06:13 AM    Glucose 77 09/24/2018 08:47 AM    BUN 11 09/24/2018 08:47 AM    Creatinine 0.74 (L) 09/24/2018 08:47 AM    BUN/Creatinine ratio 15 09/24/2018 08:47 AM    GFR est  09/24/2018 08:47 AM    GFR est non-AA 93 09/24/2018 08:47 AM    Calcium 9.4 09/24/2018 08:47 AM    Bilirubin, total 0.7 09/24/2018 08:47 AM    AST (SGOT) 21 09/24/2018 08:47 AM    Alk.  phosphatase 56 09/24/2018 08:47 AM    Protein, total 6.6 09/24/2018 08:47 AM    Albumin 4.2 09/24/2018 08:47 AM    Globulin 3.4 08/22/2018 07:33 AM    A-G Ratio 1.8 09/24/2018 08:47 AM    ALT (SGPT) 13 09/24/2018 08:47 AM         Assessment:     Assessment:        ICD-10-CM ICD-9-CM    1. Paroxysmal atrial fibrillation (HCC) I48.0 427.31 AMB POC EKG ROUTINE W/ 12 LEADS, INTER & REP   2. Atrial fibrillation status post cardioversion ([address] I48.91 427.31    3. S/P ablation of atrial fibrillation Z98.890 V45.89     Z86.79     4. Hyperthyroidism E05.90 242.90      Orders Placed This Encounter    AMB POC EKG ROUTINE W/ 12 LEADS, INTER & REP     Order Specific Question:   Reason for Exam:     Answer:   routine        Plan:   Mr. Laverne Turner is here for follow up s/p PVI with subsequent cardioversion to NSR. He is doing well and maintaining SR. He believes he's had a few brief episodes of breakthrough AF, but none this week. EKG shows sinus bradycardia. Continue current medical therapy and follow up in 3 months. Continue medical management for hyperthyroidism. Thank you for allowing me to participate in Kristian Mariola 's care.     Jose Juan Schuler NP

## 2018-10-18 NOTE — PROGRESS NOTES
Chief Complaint   Patient presents with   [facility] Follow Up     1. Have you been to the ER, urgent care clinic since your last visit? Hospitalized since your last visit? Yes When: [facility] ablation 10/03/2018    2. Have you seen or consulted any other health care providers outside of the [address] [place] [address] since your last visit? Include any pap smears or colon screening.  No

## 2018-11-12 RX ORDER — RIVAROXABAN 20 MG/1
TABLET, FILM COATED ORAL
Qty: 90 TAB | Refills: 2 | Status: SHIPPED | OUTPATIENT
Start: 2018-11-12 | End: 2019-07-16

## 2019-01-15 ENCOUNTER — OFFICE VISIT (OUTPATIENT)
Dept: CARDIOLOGY CLINIC | Age: 74
End: 2019-01-15

## 2019-01-15 VITALS
WEIGHT: 150.2 LBS | HEART RATE: 48 BPM | DIASTOLIC BLOOD PRESSURE: 70 MMHG | OXYGEN SATURATION: 97 % | BODY MASS INDEX: 20.34 KG/M2 | SYSTOLIC BLOOD PRESSURE: 130 MMHG | HEIGHT: 72 IN | RESPIRATION RATE: 18 BRPM

## 2019-01-15 DIAGNOSIS — I48.0 PAROXYSMAL ATRIAL FIBRILLATION (HCC): ICD-10-CM

## 2019-01-15 DIAGNOSIS — R00.1 SINUS BRADYCARDIA: ICD-10-CM

## 2019-01-15 DIAGNOSIS — Z86.79 S/P ABLATION OF ATRIAL FIBRILLATION: ICD-10-CM

## 2019-01-15 DIAGNOSIS — R55 SYNCOPE AND COLLAPSE: ICD-10-CM

## 2019-01-15 DIAGNOSIS — I49.9 IRREGULAR HEARTBEAT: Primary | ICD-10-CM

## 2019-01-15 DIAGNOSIS — I77.810 DILATED AORTIC ROOT (HCC): ICD-10-CM

## 2019-01-15 DIAGNOSIS — Z98.890 S/P ABLATION OF ATRIAL FIBRILLATION: ICD-10-CM

## 2019-01-15 NOTE — PROGRESS NOTES
1. Have you been to the ER, urgent care clinic since your last visit? Hospitalized since your last visit? 10-3-18 [address] [place]. 2. Have you seen or consulted any other health care providers outside of the [address] [place] [address] since your last visit? Include any pap smears or colon screening. No 
 
Chief Complaint Patient presents with  Irregular Heart Beat  
  3 mo appt. Denied cardiac symptoms.

## 2019-01-15 NOTE — PROGRESS NOTES
Subjective: Estuardo Baig is a 72 y.o. male is here for EP follow up from AF. The patient denies chest pain/ shortness of breath, orthopnea, PND, LE edema, palpitations, syncope, presyncope or fatigue. Patient Active Problem List  
 Diagnosis Date Noted  A-fib ([facility] 10/03/2018  Syncope 2018  Dilated aortic root ([facility] 2016  Sinus bradycardia 2016  Carotid stenosis, bilateral   
 Paroxysmal atrial fibrillation (HCC)  Hyperthyroidism Phani Coe MD 
Past Medical History:  
Diagnosis Date  Carotid stenosis, bilateral   
 Hollenhorst plaque, left eye 2012  Hyperthyroidism  Paroxysmal atrial fibrillation (HCC) Past Surgical History:  
Procedure Laterality Date  ECHO 2D ADULT  2011 EF 60%, Trace AI, PA 41 No Known Allergies Family History Problem Relation Age of Onset  No Known Problems Mother  No Known Problems Father   
 negative for cardiac disease Social History Socioeconomic History  Marital status:  Spouse name: Not on file  Number of children: Not on file  Years of education: Not on file  Highest education level: Not on file Tobacco Use  Smoking status: Former Smoker Types: Cigarettes Last attempt to quit: 1970 Years since quittin.6  Smokeless tobacco: Never Used Substance and Sexual Activity  Alcohol use: Yes Comment: every day Current Outpatient Medications Medication Sig  XARELTO 20 mg tab tablet TAKE 1 TABLET DAILY WITH DINNER  
 OTHER Calcium,magnesium ,zinc & vitamin d3 3 tablets daily  cyanocobalamin (VITAMIN B-12) 1,000 mcg tablet Take 1,000 mcg by mouth daily. Indications: nightly  aspirin delayed-release 81 mg tablet Take  by mouth daily.  fluticasone (FLONASE) 50 mcg/Actuation nasal spray by Nasal route as needed. No current facility-administered medications for this visit. Vitals: 01/15/19 1009 BP: 130/70 Pulse: (!) 48 Resp: 18 SpO2: 97% Weight: 150 lb 3.2 oz (68.1 kg) Height: 6' (1.829 m) I have reviewed the nurses notes, vitals, problem list, allergy list, medical history, family, social history and medications. Review of Symptoms: 
 
General: Pt denies excessive weight gain or loss. Pt is able to conduct ADL's HEENT: Denies blurred vision, headaches, epistaxis and difficulty swallowing. Respiratory: Denies shortness of breath, DONOHUE, wheezing or stridor. Cardiovascular: Denies precordial pain, palpitations, edema or PND Gastrointestinal: Denies poor appetite, indigestion, abdominal pain or blood in stool Urinary: Denies dysuria, pyuria Musculoskeletal: Denies pain or swelling from muscles or joints Neurologic: Denies tremor, paresthesias, or sensory motor disturbance Skin: Denies rash, itching or texture change. Psych: Denies depression Physical Exam:   
 
General: Well developed, in no acute distress. HEENT: Eyes - PERRL, no jvd Heart:  Normal S1/S2 negative S3 or S4. Regular, no murmur, gallop or rub.  
Respiratory: Clear bilaterally x 4, no wheezing or rales Extremities:  No edema, normal cap refill, no cyanosis. Musculoskeletal: No clubbing Neuro: A&Ox3, speech clear, gait stable. Skin: Skin color is normal. No rashes or lesions. Non diaphoretic Vascular: 2+ pulses symmetric in all extremities Cardiographics Ekg: marked sinus bradycardia, ventricular rate 47 Results for orders placed or performed during the hospital encounter of 10/03/18 EKG, 12 LEAD, INITIAL Result Value Ref Range Ventricular Rate 62 BPM  
 Atrial Rate 62 BPM  
 P-R Interval 154 ms QRS Duration 88 ms Q-T Interval 408 ms QTC Calculation (Bezet) 414 ms Calculated P Axis 44 degrees Calculated R Axis 42 degrees Calculated T Axis 50 degrees Diagnosis Normal sinus rhythm Early repolarization When compared with ECG of 22-AUG-2018 07:44, 
 No significant change was found Confirmed by Wes Shay ([id number]) on 10/4/2018 8:42:13 AM 
  
Results for orders placed or performed in visit on 05/12/14 AMB POC EKG ROUTINE W/ 12 LEADS, INTER & REP Narrative Elana Moritz Lab Results Component Value Date/Time WBC 5.1 09/24/2018 08:47 AM  
 HGB 12.3 (L) 09/24/2018 08:47 AM  
 HCT 35.9 (L) 09/24/2018 08:47 AM  
 PLATELET 936 31/64/7341 08:47 AM  
 MCV 96 09/24/2018 08:47 AM  
  
Lab Results Component Value Date/Time Sodium 140 09/24/2018 08:47 AM  
 Potassium 4.8 09/24/2018 08:47 AM  
 Chloride 100 09/24/2018 08:47 AM  
 CO2 28 09/24/2018 08:47 AM  
 Anion gap 5 08/23/2018 06:13 AM  
 Glucose 77 09/24/2018 08:47 AM  
 BUN 11 09/24/2018 08:47 AM  
 Creatinine 0.74 (L) 09/24/2018 08:47 AM  
 BUN/Creatinine ratio 15 09/24/2018 08:47 AM  
 GFR est  09/24/2018 08:47 AM  
 GFR est non-AA 93 09/24/2018 08:47 AM  
 Calcium 9.4 09/24/2018 08:47 AM  
 Bilirubin, total 0.7 09/24/2018 08:47 AM  
 AST (SGOT) 21 09/24/2018 08:47 AM  
 Alk. phosphatase 56 09/24/2018 08:47 AM  
 Protein, total 6.6 09/24/2018 08:47 AM  
 Albumin 4.2 09/24/2018 08:47 AM  
 Globulin 3.4 08/22/2018 07:33 AM  
 A-G Ratio 1.8 09/24/2018 08:47 AM  
 ALT (SGPT) 13 09/24/2018 08:47 AM  
  
Lab Results Component Value Date/Time TSH 1.89 08/22/2018 07:30 AM  
 
 
 Assessment: ICD-10-CM ICD-9-CM 1. Irregular heartbeat I49.9 427.9 AMB POC EKG ROUTINE W/ 12 LEADS, INTER & REP 2. Paroxysmal atrial fibrillation (HCC) I48.0 427.31   
3. Dilated aortic root (HCC) I77.810 447.71   
4. S/P ablation of atrial fibrillation Z98.890 V45.89   
 Z86.79    
5. Sinus bradycardia R00.1 427.89   
6. Syncope and collapse R55 780.2 Orders Placed This Encounter  AMB POC EKG ROUTINE W/ 12 LEADS, INTER & REP Order Specific Question:   Reason for Exam: Answer:   routine  Plan:  
 
Tony Philippe is S/P PVI of all 4 PVs, cardioversion to sinus rhythm 10/3/18. He continues to feel well. He remains in sinus bradycardia on beta blocker and xarelto. Will d/c atenolol and continue OAC and follow up in 6 mo. Continue medical management for htn and bradycardia. Thank you for allowing me to participate in Anand Lopez 's care. Florette Fleischer, NP Patient seen and examined. All pertinent data reviewed. I have reviewed detailed note as outlined by Florette Fleischer, NP. Case discussed with Nursing/medical assistant staff and Florette Fleischer, NP. Plans as outlined. In sinus. Will stop atenolol due to bradycardia. Cont oac for af. F/u in 6 months. No further syncope.  
 
Jaylyn Kerns MD, [facility]

## 2019-02-20 ENCOUNTER — OFFICE VISIT (OUTPATIENT)
Dept: CARDIOLOGY CLINIC | Age: 74
End: 2019-02-20

## 2019-02-20 VITALS
WEIGHT: 151 LBS | HEIGHT: 72 IN | DIASTOLIC BLOOD PRESSURE: 70 MMHG | RESPIRATION RATE: 16 BRPM | OXYGEN SATURATION: 100 % | BODY MASS INDEX: 20.45 KG/M2 | HEART RATE: 54 BPM | SYSTOLIC BLOOD PRESSURE: 120 MMHG

## 2019-02-20 DIAGNOSIS — R00.1 SINUS BRADYCARDIA: ICD-10-CM

## 2019-02-20 DIAGNOSIS — I48.0 PAROXYSMAL ATRIAL FIBRILLATION (HCC): Primary | ICD-10-CM

## 2019-02-20 DIAGNOSIS — Z86.79 S/P ABLATION OF ATRIAL FIBRILLATION: ICD-10-CM

## 2019-02-20 DIAGNOSIS — E05.90 HYPERTHYROIDISM: ICD-10-CM

## 2019-02-20 DIAGNOSIS — I77.810 DILATED AORTIC ROOT (HCC): ICD-10-CM

## 2019-02-20 DIAGNOSIS — I65.23 CAROTID STENOSIS, BILATERAL: ICD-10-CM

## 2019-02-20 DIAGNOSIS — Z98.890 S/P ABLATION OF ATRIAL FIBRILLATION: ICD-10-CM

## 2019-02-20 RX ORDER — METHIMAZOLE 5 MG/1
2.5 TABLET ORAL
COMMUNITY
Start: 2019-02-11

## 2019-02-20 NOTE — PROGRESS NOTES
Verified patient with two patient identifiers. Medications reviewed/approved by Dr. Rockey Cranker. A verbal from Dr. Rockey Cranker was given to remove any medications that were deleted during the visit. Medication(s) removed: none    Chief Complaint   Patient presents with    Irregular Heart Beat     6 month follow up    Slow Heart Rate    Carotid Artery Stenosis     1. Have you been to the ER, urgent care clinic since your last visit? Hospitalized since your last visit? Yes, [id number] [place] [facility] 10/2018 afib procedure. 2. Have you seen or consulted any other health care providers outside of the [address] [place] [address] since your last visit? Include any pap smears or colon screening. Dr. Kamini Garcia and Dr. Sumanth Clayton for cataracts 1.5 week ago.

## 2019-02-20 NOTE — PROGRESS NOTES
Grace Michael is a 72 y.o. male is here for routine f/u. S/p RFA/PVI for PAF, cardioversion to sinus bradycardia. Seen last month by Dr. Jonny costa 49, atenolol d/c's. No current CV sx or complaints. The patient denies chest pain/ shortness of breath, orthopnea, PND, LE edema, palpitations, syncope, presyncope or fatigue.        Patient Active Problem List    Diagnosis Date Noted    A-fib [facility]) 10/03/2018    Syncope 2018    Dilated aortic root ([facility] [address] 2016    Sinus bradycardia 2016    Carotid stenosis, bilateral     Paroxysmal atrial fibrillation (HCC)     Hyperthyroidism       Charito Barcenas MD  Past Medical History:   Diagnosis Date    Carotid stenosis, bilateral     Hollenhorst plaque, left eye 2012    Hyperthyroidism     Paroxysmal atrial fibrillation (HCC)       Past Surgical History:   Procedure Laterality Date    ECHO 2D ADULT  2011    EF 60%, Trace AI, PA 41     No Known Allergies   Family History   Problem Relation Age of Onset    No Known Problems Mother     No Known Problems Father       Social History     Socioeconomic History    Marital status:      Spouse name: Not on file    Number of children: Not on file    Years of education: Not on file    Highest education level: Not on file   Social Needs    Financial resource strain: Not on file    Food insecurity - worry: Not on file    Food insecurity - inability: Not on file   [employer] needs - medical: Not on file   [employer] needs - non-medical: Not on file   Occupational History    Not on file   Tobacco Use    Smoking status: Former Smoker     Types: Cigarettes     Last attempt to quit: 1970     Years since quittin.7    Smokeless tobacco: Never Used   Substance and Sexual Activity    Alcohol use: Yes     Comment: every day    Drug use: Not on file    Sexual activity: Not on file   Other Topics Concern    Not on file   Social History Narrative    Not on file      Current Outpatient Medications   Medication Sig    methIMAzole (TAPAZOLE) 5 mg tablet Take 2.5 mg by mouth every Monday and Thursday.  XARELTO 20 mg tab tablet TAKE 1 TABLET DAILY WITH DINNER    OTHER Calcium,magnesium ,zinc & vitamin d3 3 tablets daily    cyanocobalamin (VITAMIN B-12) 1,000 mcg tablet Take 1,000 mcg by mouth daily. Indications: nightly    aspirin delayed-release 81 mg tablet Take  by mouth daily.  fluticasone (FLONASE) 50 mcg/Actuation nasal spray by Nasal route as needed. No current facility-administered medications for this visit. Review of Symptoms:    CONST  No weight change. No fever, chills, sweats    ENT No visual changes, URI sx, sore throat    CV  See HPI   RESP  No cough, or sputum, wheezing. Also see HPI   GI  No abdominal pain or change in bowel habits. No heartburn or dysphagia. No melena or rectal bleeding.   No dysuria, urgency, frequency, hematuria   MSKEL  No joint pain, swelling. No muscle pain. SKIN  No rash or lesions. NEURO  No headache, syncope, or seizure. No weakness, loss of sensation, or paresthesias. PSYCH  No low mood or depression  No anxiety. HE/LYMPH  No easy bruising, abnormal bleeding, or enlarged glands.         Physical ExamPhysical Exam:    Visit Vitals  /70 (BP 1 Location: Left arm, BP Patient Position: Sitting)   Pulse (!) 54   Resp 16   Ht 6' (1.829 m)   Wt 151 lb (68.5 kg)   SpO2 100% Comment: ra   BMI 20.48 kg/m²     Gen: NAD  HEENT:  PERRL, throat clear  Neck: no adenopathy, no thyromegaly, no JVD   Heart:  Regular,Nl S1S2,  no murmur, gallop or rub.   Lungs:  clear  Abdomen:   Soft, non-tender, bowel sounds are active.   Extremities:  No edema  Pulse: symmetric  Neuro: A&O times 3, No focal neuro deficits    Cardiographics    Labs:   Lab Results   Component Value Date/Time    Sodium 140 09/24/2018 08:47 AM    Sodium 140 08/23/2018 06:13 AM    Sodium 141 08/22/2018 07:33 AM    Potassium 4.8 09/24/2018 08:47 AM    Potassium 3.8 08/23/2018 06:13 AM    Potassium 3.9 08/22/2018 07:33 AM    Chloride 100 09/24/2018 08:47 AM    Chloride 106 08/23/2018 06:13 AM    Chloride 103 08/22/2018 07:33 AM    CO2 28 09/24/2018 08:47 AM    CO2 29 08/23/2018 06:13 AM    CO2 33 (H) 08/22/2018 07:33 AM    Anion gap 5 08/23/2018 06:13 AM    Anion gap 5 08/22/2018 07:33 AM    Glucose 77 09/24/2018 08:47 AM    Glucose 96 08/23/2018 06:13 AM    Glucose 128 (H) 08/22/2018 07:33 AM    BUN 11 09/24/2018 08:47 AM    BUN 6 08/23/2018 06:13 AM    BUN 14 08/22/2018 07:33 AM    Creatinine 0.74 (L) 09/24/2018 08:47 AM    Creatinine 0.63 (L) 08/23/2018 06:13 AM    Creatinine 0.79 08/22/2018 07:33 AM    BUN/Creatinine ratio 15 09/24/2018 08:47 AM    BUN/Creatinine ratio 10 (L) 08/23/2018 06:13 AM    BUN/Creatinine ratio 18 08/22/2018 07:33 AM    GFR est  09/24/2018 08:47 AM    GFR est AA >60 08/23/2018 06:13 AM    GFR est AA >60 08/22/2018 07:33 AM    GFR est non-AA 93 09/24/2018 08:47 AM    GFR est non-AA >60 08/23/2018 06:13 AM    GFR est non-AA >60 08/22/2018 07:33 AM    Calcium 9.4 09/24/2018 08:47 AM    Calcium 8.6 08/23/2018 06:13 AM    Calcium 8.9 08/22/2018 07:33 AM    Bilirubin, total 0.7 09/24/2018 08:47 AM    Bilirubin, total 1.4 (H) 08/22/2018 07:33 AM    AST (SGOT) 21 09/24/2018 08:47 AM    AST (SGOT) 18 08/22/2018 07:33 AM    Alk. phosphatase 56 09/24/2018 08:47 AM    Alk.  phosphatase 61 08/22/2018 07:33 AM    Protein, total 6.6 09/24/2018 08:47 AM    Protein, total 7.2 08/22/2018 07:33 AM    Albumin 4.2 09/24/2018 08:47 AM    Albumin 3.8 08/22/2018 07:33 AM    Globulin 3.4 08/22/2018 07:33 AM    A-G Ratio 1.8 09/24/2018 08:47 AM    A-G Ratio 1.1 08/22/2018 07:33 AM    ALT (SGPT) 13 09/24/2018 08:47 AM    ALT (SGPT) 21 08/22/2018 07:33 AM     Lab Results   Component Value Date/Time    CK 47 08/22/2018 07:33 AM     Lab Results   Component Value Date/Time    Cholesterol, total 215 (H) 09/24/2012 07:40 AM    HDL Cholesterol 42 09/24/2012 07:40 AM    LDL, calculated 152 (H) 09/24/2012 07:40 AM    Triglyceride 107 09/24/2012 07:40 AM     No results found for this or any previous visit. Assessment:         Patient Active Problem List    Diagnosis Date Noted    A-fib [facility]) 10/03/2018    Syncope 08/22/2018    Dilated aortic root ([facility] [address] 12/14/2016    Sinus bradycardia 12/14/2016    Carotid stenosis, bilateral     Paroxysmal atrial fibrillation ([facility] [address]     Hyperthyroidism      S/p RFA/PVI for PAF, cardioversion to sinus bradycardia. Seen last month by Dr. Jim Contreras julissa 47, atenolol d/c's. No current CV sx or complaints. Plan:     Doing well with no adverse cardiac symptoms. Lipids and labs followed by PCP. Continue current care and f/u in 12 months.     Ariel Mejia MD

## 2019-07-16 ENCOUNTER — OFFICE VISIT (OUTPATIENT)
Dept: CARDIOLOGY CLINIC | Age: 74
End: 2019-07-16

## 2019-07-16 VITALS
WEIGHT: 151 LBS | DIASTOLIC BLOOD PRESSURE: 60 MMHG | HEIGHT: 72 IN | SYSTOLIC BLOOD PRESSURE: 136 MMHG | BODY MASS INDEX: 20.45 KG/M2 | OXYGEN SATURATION: 97 % | RESPIRATION RATE: 16 BRPM | HEART RATE: 52 BPM

## 2019-07-16 DIAGNOSIS — I48.0 PAROXYSMAL ATRIAL FIBRILLATION (HCC): Primary | ICD-10-CM

## 2019-07-16 DIAGNOSIS — Z98.890 S/P ABLATION OF ATRIAL FIBRILLATION: ICD-10-CM

## 2019-07-16 DIAGNOSIS — R00.1 SINUS BRADYCARDIA: ICD-10-CM

## 2019-07-16 DIAGNOSIS — Z86.79 S/P ABLATION OF ATRIAL FIBRILLATION: ICD-10-CM

## 2019-07-16 NOTE — LETTER
7/16/19 Patient: Court Dhaliwal YOB: 1945 Date of Visit: 7/16/2019 Mariel Mondragon MD 
. [facility] [address] 
[place] [id number] VIA Facsimile: [phone] Dear Mariel Mondragon MD, Thank you for referring Mr. Laura Kaufman to [address] [place] [address] [facility] for evaluation. My notes for this consultation are attached. If you have questions, please do not hesitate to call me. I look forward to following your patient along with you. Sincerely, Lynda Duffy MD

## 2019-07-16 NOTE — PROGRESS NOTES
Subjective: John Bolivar is a 72 y.o. male is here for follow up of atrial fibrillation. The patient denies chest pain/ shortness of breath, orthopnea, PND, LE edema, palpitations, syncope, presyncope or fatigue. Patient Active Problem List    Diagnosis Date Noted    A-fib [facility]) 10/03/2018    Syncope 2018    Dilated aortic root ([address] 2016    Sinus bradycardia 2016    Carotid stenosis, bilateral     Paroxysmal atrial fibrillation (HCC)     Hyperthyroidism       Jennifer Perez MD  Past Medical History:   Diagnosis Date    Carotid stenosis, bilateral     Hollenhorst plaque, left eye 2012    Hyperthyroidism     Paroxysmal atrial fibrillation (HCC)       Past Surgical History:   Procedure Laterality Date    ECHO 2D ADULT  2011    EF 60%, Trace AI, PA 41     No Known Allergies   Family History   Problem Relation Age of Onset    No Known Problems Mother     No Known Problems Father     negative for cardiac disease  Social History     Socioeconomic History    Marital status:      Spouse name: Not on file    Number of children: Not on file    Years of education: Not on file    Highest education level: Not on file   Tobacco Use    Smoking status: Former Smoker     Types: Cigarettes     Last attempt to quit: 1970     Years since quittin.1    Smokeless tobacco: Never Used   Substance and Sexual Activity    Alcohol use: Yes     Comment: every day     Current Outpatient Medications   Medication Sig    methIMAzole (TAPAZOLE) 5 mg tablet Take 2.5 mg by mouth every Monday and Thursday.  OTHER Calcium,magnesium ,zinc & vitamin d3 3 tablets daily    cyanocobalamin (VITAMIN B-12) 1,000 mcg tablet Take 1,000 mcg by mouth daily. Indications: nightly    aspirin delayed-release 81 mg tablet Take  by mouth daily.  fluticasone (FLONASE) 50 mcg/Actuation nasal spray by Nasal route as needed.      No current facility-administered medications for this visit. Vitals:    07/16/19 1206   BP: 136/60   Pulse: (!) 52   Resp: 16   SpO2: 97%   Weight: 151 lb (68.5 kg)   Height: 6' (1.829 m)       I have reviewed the nurses notes, vitals, problem list, allergy list, medical history, family, social history and medications. Review of Symptoms:    General: Pt denies excessive weight gain or loss. Pt is able to conduct ADL's  HEENT: Denies blurred vision, headaches, epistaxis and difficulty swallowing. Respiratory: Denies shortness of breath, DONOHUE, wheezing or stridor. Cardiovascular: Denies precordial pain, palpitations, edema or PND  Gastrointestinal: Denies poor appetite, indigestion, abdominal pain or blood in stool  Urinary: Denies dysuria, pyuria  Musculoskeletal: Denies pain or swelling from muscles or joints  Neurologic: Denies tremor, paresthesias, or sensory motor disturbance  Skin: Denies rash, itching or texture change. Psych: Denies depression      Physical Exam:      General: Well developed, in no acute distress. HEENT: Eyes - PERRL, no jvd  Heart:  Normal S1/S2 negative S3 or S4. Regular, no murmur, gallop or rub. Respiratory: Clear bilaterally x 4, no wheezing or rales  Abdomen:   Soft, non-tender, bowel sounds are active. Extremities:  No edema, normal cap refill, no cyanosis. Musculoskeletal: No clubbing  Neuro: A&Ox3, speech clear, gait stable. Skin: Skin color is normal. No rashes or lesions.  Non diaphoretic  Vascular: 2+ pulses symmetric in all extremities    Cardiographics    Ekg: nsr    Results for orders placed or performed during the hospital encounter of 10/03/18   EKG, 12 LEAD, INITIAL   Result Value Ref Range    Ventricular Rate 62 BPM    Atrial Rate 62 BPM    P-R Interval 154 ms    QRS Duration 88 ms    Q-T Interval 408 ms    QTC Calculation (Bezet) 414 ms    Calculated P Axis 44 degrees    Calculated R Axis 42 degrees    Calculated T Axis 50 degrees    Diagnosis       Normal sinus rhythm  Early repolarization  When compared with ECG of 22-AUG-2018 07:44,  No significant change was found  Confirmed by Salty San ([id number]) on 10/4/2018 8:42:13 AM     Results for orders placed or performed in visit on 05/12/14   AMB POC EKG ROUTINE W/ 12 LEADS, INTER & REP    Narrative    Alanis Crespo             Lab Results   Component Value Date/Time    WBC 5.1 09/24/2018 08:47 AM    HGB 12.3 (L) 09/24/2018 08:47 AM    HCT 35.9 (L) 09/24/2018 08:47 AM    PLATELET 993 21/02/2765 08:47 AM    MCV 96 09/24/2018 08:47 AM      Lab Results   Component Value Date/Time    Sodium 140 09/24/2018 08:47 AM    Potassium 4.8 09/24/2018 08:47 AM    Chloride 100 09/24/2018 08:47 AM    CO2 28 09/24/2018 08:47 AM    Anion gap 5 08/23/2018 06:13 AM    Glucose 77 09/24/2018 08:47 AM    BUN 11 09/24/2018 08:47 AM    Creatinine 0.74 (L) 09/24/2018 08:47 AM    BUN/Creatinine ratio 15 09/24/2018 08:47 AM    GFR est  09/24/2018 08:47 AM    GFR est non-AA 93 09/24/2018 08:47 AM    Calcium 9.4 09/24/2018 08:47 AM    Bilirubin, total 0.7 09/24/2018 08:47 AM    AST (SGOT) 21 09/24/2018 08:47 AM    Alk. phosphatase 56 09/24/2018 08:47 AM    Protein, total 6.6 09/24/2018 08:47 AM    Albumin 4.2 09/24/2018 08:47 AM    Globulin 3.4 08/22/2018 07:33 AM    A-G Ratio 1.8 09/24/2018 08:47 AM    ALT (SGPT) 13 09/24/2018 08:47 AM         Assessment:     Assessment:        ICD-10-CM ICD-9-CM    1. Paroxysmal atrial fibrillation (HCC) I48.0 427.31 AMB POC EKG ROUTINE W/ 12 LEADS, INTER & REP   2. S/P ablation of atrial fibrillation Z98.890 V45.89     Z86.79     3. Sinus bradycardia R00.1 427.89      Orders Placed This Encounter    AMB POC EKG ROUTINE W/ 12 LEADS, INTER & REP     Order Specific Question:   Reason for Exam:     Answer:   routine        Plan:   Mr. Familia Krueger is S/P PVI of all 4 PVs, cardioversion to sinus rhythm 10/3/18. He continues to feel well. He remains in sinus bradycardia on beta blocker and xarelto. Will d/c atenolol. chadsvasc score of 0.  Will discontinue [address] [place] [address] and follow up in 12 mo. Continue medical management for thyroid disease. Thank you for allowing me to [facility].       Yandel Gayle MD

## 2019-07-16 NOTE — PROGRESS NOTES
Verified patient with 2 identifiers   Reviewed record in preparation for visit and obtained necessary documentation. Chief Complaint   Patient presents with    Irregular Heart Beat     6  month follow up      1. Have you been to the ER, urgent care clinic since your last visit? Hospitalized since your last visit? No     2. Have you seen or consulted any other health care providers outside of the [address] [place] [address] since your last visit? Include any pap smears or colon screening.    Yes Dr Matthew Leslie opthamology         Francisca Milton

## 2019-09-26 NOTE — ANESTHESIA PREPROCEDURE EVALUATION
Anesthetic History No history of anesthetic complications Review of Systems / Medical History Patient summary reviewed, nursing notes reviewed and pertinent labs reviewed Pulmonary Within defined limits Neuro/Psych Within defined limits Cardiovascular Dysrhythmias : atrial fibrillation PAD Exercise tolerance: >4 METS Comments: Ef 65% GI/Hepatic/Renal 
Within defined limits Endo/Other Hypothyroidism Other Findings Physical Exam 
 
Airway Mallampati: II 
TM Distance: 4 - 6 cm Neck ROM: normal range of motion Mouth opening: Normal 
 
 Cardiovascular Regular rate and rhythm,  S1 and S2 normal,  no murmur, click, rub, or gallop Dental 
No notable dental hx Pulmonary Breath sounds clear to auscultation Abdominal 
GI exam deferred Other Findings Anesthetic Plan ASA: 2 Anesthesia type: general 
 
 
 
 
Induction: Intravenous Anesthetic plan and risks discussed with: Patient
25-Sep-2019 23:05

## 2019-11-21 NOTE — PROGRESS NOTES
BIBA complaining of abdominal pain, nausea and vomiting times 3 days. noted with tremors at triage. history of alcohol abuse. last drink 1 month ago. Verified patient with two patient identifiers. Medications reviewed/approved by Dr. Cosme Norman.

## 2020-02-05 ENCOUNTER — OFFICE VISIT (OUTPATIENT)
Dept: CARDIOLOGY CLINIC | Age: 75
End: 2020-02-05

## 2020-02-05 VITALS
HEART RATE: 55 BPM | SYSTOLIC BLOOD PRESSURE: 122 MMHG | HEIGHT: 72 IN | RESPIRATION RATE: 14 BRPM | WEIGHT: 158 LBS | BODY MASS INDEX: 21.4 KG/M2 | OXYGEN SATURATION: 98 % | DIASTOLIC BLOOD PRESSURE: 60 MMHG

## 2020-02-05 DIAGNOSIS — I65.23 CAROTID STENOSIS, BILATERAL: ICD-10-CM

## 2020-02-05 DIAGNOSIS — Z86.79 S/P ABLATION OF ATRIAL FIBRILLATION: ICD-10-CM

## 2020-02-05 DIAGNOSIS — I48.0 PAROXYSMAL ATRIAL FIBRILLATION (HCC): Primary | ICD-10-CM

## 2020-02-05 DIAGNOSIS — Z98.890 S/P ABLATION OF ATRIAL FIBRILLATION: ICD-10-CM

## 2020-02-05 DIAGNOSIS — I77.810 DILATED AORTIC ROOT (HCC): ICD-10-CM

## 2020-02-05 DIAGNOSIS — R00.1 SINUS BRADYCARDIA: ICD-10-CM

## 2020-02-05 NOTE — PROCEDURES
PATIENT ID VERIFIED WITH TWO PATIENT IDENTIFIERS. PATIENT MEDICATIONS REVIEWED AND APPROVED BY DR. Maureen Leblanc. Chief Complaint   Patient presents with    Irregular Heart Beat     6 mo f/u    Slow Heart Rate    Carotid Artery Stenosis     1. Have you been to the ER, urgent care clinic since your last visit? Hospitalized since your last visit?no     2. Have you seen or consulted any other health care providers outside of the [facility] since your last visit? Include any pap smears or colon screening.   Yes PCP Dr. Casey Charlton three months ago for routine f/u and dermatology [place], [place]  Dec 2019 for skin lesion f/u

## 2020-02-05 NOTE — PROGRESS NOTES
Include Z78.9 (Other Specified Conditions Influencing Health Status) As An Associated Diagnosis?: No Jass Cerda is a 69 y.o. male is here for routine f/u. No current CV sx or complaints. S/p RFA/PVI for PAF, cardioversion to sinus bradycardia--beta blocker stopped at f/u with Dr. Kelton Huntley.  Off anticoagulation ([address] 1). Continues to see PCP. Last Echo  with LVEF 65-70, mild aortic root dilation 4.2 (stable). Carotid dopplers  with mild LICA, mod RADHA (unchanged). Labs 10/19 with chol 169, LDL 91, HDL 66, TG 60 (vegan diet), TSH 1.0, T4 1.41, CBC/CMP normal . The patient denies chest pain/ shortness of breath, orthopnea, PND, LE edema, palpitations, syncope, presyncope or fatigue.        Patient Active Problem List    Diagnosis Date Noted    A-fib [facility]) 10/03/2018    Syncope 2018    Dilated aortic root ([address] 2016    Sinus bradycardia 2016    Carotid stenosis, bilateral     Paroxysmal atrial fibrillation (HCC)     Hyperthyroidism       Christie Barkley MD  Past Medical History:   Diagnosis Date    Carotid stenosis, bilateral     Hollenhorst plaque, left eye 2012    Hyperthyroidism     Paroxysmal atrial fibrillation [facility])       Past Surgical History:   Procedure Laterality Date    ECHO 2D ADULT  2011    EF 60%, Trace YULIANA PA 43     No Known Allergies   Family History   Problem Relation Age of Onset    No Known Problems Mother     No Known Problems Father       Social History     Socioeconomic History    Marital status:      Spouse name: Not on file    Number of children: Not on file    Years of education: Not on file    Highest education level: Not on file   Occupational History    Not on file   Social Needs    Financial resource strain: Not on file    Food insecurity:     Worry: Not on file     Inability: Not on file    Transportation needs:     Medical: Not on file     Non-medical: Not on file   Tobacco Use    Smoking status: Former Smoker     Types: Cigarettes     Last attempt to quit: 1970     Years since quittin.7    Lot # For Kenalog (Optional): [id number] Smokeless tobacco: Never Used   Substance and Sexual Activity    Alcohol use: Yes     Comment: every day    Drug use: Not on file    Sexual activity: Not on file   Lifestyle    Physical activity:     Days per week: Not on file     Minutes per session: Not on file    Stress: Not on file   Relationships    Social connections:     Talks on phone: Not on file     Gets together: Not on file     Attends Synagogue service: Not on file     Active member of club or organization: Not on file     Attends meetings of clubs or organizations: Not on file     Relationship status: Not on file    Intimate partner violence:     Fear of current or ex partner: Not on file     Emotionally abused: Not on file     Physically abused: Not on file     Forced sexual activity: Not on file   Other Topics Concern    Not on file   Social History Narrative    Not on file      Current Outpatient Medications   Medication Sig    methIMAzole (TAPAZOLE) 5 mg tablet Take 2.5 mg by mouth every Monday and Thursday.  OTHER Calcium,magnesium ,zinc & vitamin d3 3 tablets daily    cyanocobalamin (VITAMIN B-12) 1,000 mcg tablet Take 1,000 mcg by mouth daily. Indications: nightly    aspirin delayed-release 81 mg tablet Take  by mouth daily.  fluticasone (FLONASE) 50 mcg/Actuation nasal spray by Nasal route as needed. No current facility-administered medications for this visit. Review of Symptoms:    CONST  No weight change. No fever, chills, sweats    ENT No visual changes, URI sx, sore throat    CV  See HPI   RESP  No cough, or sputum, wheezing. Also see HPI   GI  No abdominal pain or change in bowel habits. No heartburn or dysphagia. No melena or rectal bleeding.   No dysuria, urgency, frequency, hematuria   MSKEL  No joint pain, swelling. No muscle pain. SKIN  No rash or lesions. NEURO  No headache, syncope, or seizure. No weakness, loss of sensation, or paresthesias.     PSYCH  No low mood or depression  No Total Volume (Ccs): 2.0 anxiety. HE/LYMPH  No easy bruising, abnormal bleeding, or enlarged glands.         Physical ExamPhysical Exam:    Visit Vitals  /60 (BP 1 Location: Right arm, BP Patient Position: Sitting)   Pulse (!) 55   Resp 14   Ht 6' (1.829 m)   Wt 158 lb (71.7 kg)   SpO2 98%   BMI 21.43 kg/m²     Gen: NAD  HEENT:  PERRL, throat clear  Neck: no adenopathy, no thyromegaly, no JVD   Heart:  Regular,Nl S1S2,  no murmur, gallop or rub.   Lungs:  clear  Abdomen:   Soft, non-tender, bowel sounds are active.   Extremities:  No edema  Pulse: symmetric  Neuro: A&O times 3, No focal neuro deficits    Cardiographics    ECG: sinus julissa 53, wnl    Labs:   Lab Results   Component Value Date/Time    Sodium 140 09/24/2018 08:47 AM    Sodium 140 08/23/2018 06:13 AM    Sodium 141 08/22/2018 07:33 AM    Potassium 4.8 09/24/2018 08:47 AM    Potassium 3.8 08/23/2018 06:13 AM    Potassium 3.9 08/22/2018 07:33 AM    Chloride 100 09/24/2018 08:47 AM    Chloride 106 08/23/2018 06:13 AM    Chloride 103 08/22/2018 07:33 AM    CO2 28 09/24/2018 08:47 AM    CO2 29 08/23/2018 06:13 AM    CO2 33 (H) 08/22/2018 07:33 AM    Anion gap 5 08/23/2018 06:13 AM    Anion gap 5 08/22/2018 07:33 AM    Glucose 77 09/24/2018 08:47 AM    Glucose 96 08/23/2018 06:13 AM    Glucose 128 (H) 08/22/2018 07:33 AM    BUN 11 09/24/2018 08:47 AM    BUN 6 08/23/2018 06:13 AM    BUN 14 08/22/2018 07:33 AM    Creatinine 0.74 (L) 09/24/2018 08:47 AM    Creatinine 0.63 (L) 08/23/2018 06:13 AM    Creatinine 0.79 08/22/2018 07:33 AM    BUN/Creatinine ratio 15 09/24/2018 08:47 AM    BUN/Creatinine ratio 10 (L) 08/23/2018 06:13 AM    BUN/Creatinine ratio 18 08/22/2018 07:33 AM    GFR est  09/24/2018 08:47 AM    GFR est AA >60 08/23/2018 06:13 AM    GFR est AA >60 08/22/2018 07:33 AM    GFR est non-AA 93 09/24/2018 08:47 AM    GFR est non-AA >60 08/23/2018 06:13 AM    GFR est non-AA >60 08/22/2018 07:33 AM    Calcium 9.4 09/24/2018 08:47 AM    Calcium 8.6 08/23/2018 06:13 AM Calcium 8.9 08/22/2018 07:33 AM    Bilirubin, total 0.7 09/24/2018 08:47 AM    Bilirubin, total 1.4 (H) 08/22/2018 07:33 AM    AST (SGOT) 21 09/24/2018 08:47 AM    AST (SGOT) 18 08/22/2018 07:33 AM    Alk. phosphatase 56 09/24/2018 08:47 AM    Alk. phosphatase 61 08/22/2018 07:33 AM    Protein, total 6.6 09/24/2018 08:47 AM    Protein, total 7.2 08/22/2018 07:33 AM    Albumin 4.2 09/24/2018 08:47 AM    Albumin 3.8 08/22/2018 07:33 AM    Globulin 3.4 08/22/2018 07:33 AM    A-G Ratio 1.8 09/24/2018 08:47 AM    A-G Ratio 1.1 08/22/2018 07:33 AM    ALT (SGPT) 13 09/24/2018 08:47 AM    ALT (SGPT) 21 08/22/2018 07:33 AM     Lab Results   Component Value Date/Time    CK 47 08/22/2018 07:33 AM     Lab Results   Component Value Date/Time    Cholesterol, total 215 (H) 09/24/2012 07:40 AM    HDL Cholesterol 42 09/24/2012 07:40 AM    LDL, calculated 152 (H) 09/24/2012 07:40 AM    Triglyceride 107 09/24/2012 07:40 AM     No results found for this or any previous visit. Assessment:         Patient Active Problem List    Diagnosis Date Noted    A-fib [facility]) 10/03/2018    Syncope 08/22/2018    Dilated [address] [address] 12/14/2016    Sinus bradycardia 12/14/2016    Carotid stenosis, bilateral     Paroxysmal atrial fibrillation (HCC)     Hyperthyroidism       No current CV sx or complaints. S/p RFA/PVI for PAF, cardioversion to sinus bradycardia--beta blocker stopped at f/u with Dr. Earlene Smith.  Off anticoagulation ([address]). Continues to see PCP. Last Echo 8/18 with LVEF 65-70, mild aortic root dilation 4.2 (stable). Carotid dopplers 6/22 with mild LICA, mod RADHA (unchanged). Labs 10/19 with chol 169, LDL 91, HDL 66, TG 60 (vegan diet), TSH 1.0, T4 1.41, CBC/CMP normal      Plan:     Doing well with no adverse cardiac symptoms. To see Dr. Earlene Smith in July as planned  Lipids and labs followed by PCP. Continue current care and f/u in 12 months.     Wing Navarro MD Expiration Date For Kenalog (Optional): 03/2019 Medical Necessity Clause: This procedure was medically necessary because the lesions that were treated were: X Size Of Lesion In Cm (Optional): 0 Concentration Of Kenalog Solution Injected (Mg/Ml): 7.0 Detail Level: Detailed Administered By (Optional): Siobhan Kenalog Preparation: Kenalog Consent: The risks of atrophy were reviewed with the patient.

## 2020-07-15 NOTE — PROGRESS NOTES
Subjective: Pola Molina is a 69 y.o. male is here for EP follow up. The patient denies chest pain/ shortness of breath, orthopnea, PND, LE edema, palpitations, syncope, presyncope or fatigue. He remains active without limitations. Has been a vegan x 8 years. Patient Active Problem List    Diagnosis Date Noted    A-fib [facility]) 10/03/2018    Syncope 2018    Dilated aortic root ([address] 2016    Sinus bradycardia 2016    Carotid stenosis, bilateral     Paroxysmal atrial fibrillation (HCC)     Hyperthyroidism       Alayna Fry MD  Past Medical History:   Diagnosis Date    Carotid stenosis, bilateral     Hollenhorst plaque, left eye 2012    Hyperthyroidism     Paroxysmal atrial fibrillation [facility])       Past Surgical History:   Procedure Laterality Date    ECHO 2D ADULT  2011    EF 60%, Trace AI, PA 43     No Known Allergies   Family History   Problem Relation Age of Onset    No Known Problems Mother     No Known Problems Father     negative for cardiac disease  Social History     Socioeconomic History    Marital status:      Spouse name: Not on file    Number of children: Not on file    Years of education: Not on file    Highest education level: Not on file   Tobacco Use    Smoking status: Former Smoker     Types: Cigarettes     Last attempt to quit: 1970     Years since quittin.1    Smokeless tobacco: Never Used   Substance and Sexual Activity    Alcohol use: Yes     Comment: every day- 1 cocktail and glass of wine with dinner     Current Outpatient Medications   Medication Sig    methIMAzole (TAPAZOLE) 5 mg tablet Take 2.5 mg by mouth every Monday and Thursday.  OTHER Calcium,magnesium ,zinc & vitamin d3 3 tablets daily    cyanocobalamin (VITAMIN B-12) 1,000 mcg tablet Take 1,000 mcg by mouth daily. Indications: nightly    aspirin delayed-release 81 mg tablet Take  by mouth daily.     fluticasone (FLONASE) 50 mcg/Actuation nasal spray by Nasal route as needed. No current facility-administered medications for this visit. Vitals:    07/16/20 0905   BP: 124/70   Pulse: (!) 54   Resp: 18   SpO2: 98%   Weight: 153 lb (69.4 kg)   Height: 6' (1.829 m)       I have reviewed the nurses notes, vitals, problem list, allergy list, medical history, family, social history and medications. Review of Symptoms:    General: Pt denies excessive weight gain or loss. Pt is able to conduct ADL's  HEENT: Denies blurred vision, headaches, epistaxis and difficulty swallowing. Respiratory: Denies shortness of breath, DONOHUE, wheezing or stridor. Cardiovascular: Denies precordial pain, palpitations, edema or PND  Gastrointestinal: Denies poor appetite, indigestion, abdominal pain or blood in stool  Urinary: Denies dysuria, pyuria  Musculoskeletal: Denies pain or swelling from muscles or joints  Neurologic: Denies tremor, paresthesias, or sensory motor disturbance  Skin: Denies rash, itching or texture change. Psych: Denies depression      Physical Exam:      General: Well developed, in no acute distress. HEENT: Eyes - PERRL, no jvd  Heart:  Normal S1/S2 negative S3 or S4. Regular, no murmur, gallop or rub. Respiratory: Clear bilaterally x 4, no wheezing or rales  Extremities:  No edema, normal cap refill, no cyanosis. Musculoskeletal: No clubbing  Neuro: A&Ox3, speech clear, gait stable. Skin: Skin color is normal. No rashes or lesions.  Non diaphoretic  Vascular: 2+ pulses symmetric in all extremities    Cardiographics    Ekg: Sinus  Bradycardia     Results for orders placed or performed during the hospital encounter of 10/03/18   EKG, 12 LEAD, INITIAL   Result Value Ref Range    Ventricular Rate 62 BPM    Atrial Rate 62 BPM    P-R Interval 154 ms    QRS Duration 88 ms    Q-T Interval 408 ms    QTC Calculation (Bezet) 414 ms    Calculated P Axis 44 degrees    Calculated R Axis 42 degrees    Calculated T Axis 50 degrees    Diagnosis Normal sinus rhythm  Early repolarization  When compared with ECG of 22-AUG-2018 07:44,  No significant change was found  Confirmed by Devon Miramontes ([id number]) on 10/4/2018 8:42:13 AM     Results for orders placed or performed in visit on 05/12/14   AMB POC EKG ROUTINE W/ 12 LEADS, INTER & REP    Narrative    Cintia Stern             Lab Results   Component Value Date/Time    WBC 5.1 09/24/2018 08:47 AM    HGB 12.3 (L) 09/24/2018 08:47 AM    HCT 35.9 (L) 09/24/2018 08:47 AM    PLATELET 486 12/77/8540 08:47 AM    MCV 96 09/24/2018 08:47 AM      Lab Results   Component Value Date/Time    Sodium 140 09/24/2018 08:47 AM    Potassium 4.8 09/24/2018 08:47 AM    Chloride 100 09/24/2018 08:47 AM    CO2 28 09/24/2018 08:47 AM    Anion gap 5 08/23/2018 06:13 AM    Glucose 77 09/24/2018 08:47 AM    BUN 11 09/24/2018 08:47 AM    Creatinine 0.74 (L) 09/24/2018 08:47 AM    BUN/Creatinine ratio 15 09/24/2018 08:47 AM    GFR est  09/24/2018 08:47 AM    GFR est non-AA 93 09/24/2018 08:47 AM    Calcium 9.4 09/24/2018 08:47 AM    Bilirubin, total 0.7 09/24/2018 08:47 AM    Alk. phosphatase 56 09/24/2018 08:47 AM    Protein, total 6.6 09/24/2018 08:47 AM    Albumin 4.2 09/24/2018 08:47 AM    Globulin 3.4 08/22/2018 07:33 AM    A-G Ratio 1.8 09/24/2018 08:47 AM    ALT (SGPT) 13 09/24/2018 08:47 AM      Lab Results   Component Value Date/Time    TSH 1.89 08/22/2018 07:30 AM        Assessment:           ICD-10-CM ICD-9-CM    1. Paroxysmal atrial fibrillation (HCC)  I48.0 427.31 AMB POC EKG ROUTINE W/ 12 LEADS, INTER & REP   2. Sinus bradycardia  R00.1 427.89    3.  S/P ablation of atrial fibrillation  Z98.890 V45.89     Z86.79       Orders Placed This Encounter    AMB POC EKG ROUTINE W/ 12 LEADS, INTER & REP     Order Specific Question:   Reason for Exam:     Answer:   routine        Plan:     Mr. Brandi Kline is S/P PVI of all 4 PVs, cardioversion to sinus rhythm 10/3/18. He continues to feel well.  He remains in sinus bradycardia off bb and [facility]. He is a chadsvasc score of 1 on ASA. We will see him back in 1 year.      Continue medical management for thyroid disease. Thank you for allowing me to participate in Caroline Euceda 's care.       Huan Stanley NP

## 2020-07-16 ENCOUNTER — OFFICE VISIT (OUTPATIENT)
Dept: CARDIOLOGY CLINIC | Age: 75
End: 2020-07-16

## 2020-07-16 VITALS
OXYGEN SATURATION: 98 % | RESPIRATION RATE: 18 BRPM | HEIGHT: 72 IN | SYSTOLIC BLOOD PRESSURE: 124 MMHG | HEART RATE: 54 BPM | DIASTOLIC BLOOD PRESSURE: 70 MMHG | BODY MASS INDEX: 20.72 KG/M2 | WEIGHT: 153 LBS

## 2020-07-16 DIAGNOSIS — Z86.79 S/P ABLATION OF ATRIAL FIBRILLATION: ICD-10-CM

## 2020-07-16 DIAGNOSIS — Z98.890 S/P ABLATION OF ATRIAL FIBRILLATION: ICD-10-CM

## 2020-07-16 DIAGNOSIS — I48.0 PAROXYSMAL ATRIAL FIBRILLATION (HCC): Primary | ICD-10-CM

## 2020-07-16 DIAGNOSIS — R00.1 SINUS BRADYCARDIA: ICD-10-CM

## 2020-07-16 NOTE — PROGRESS NOTES
Chief Complaint   Patient presents with    Irregular Heart Beat     Annual follow up -    Leg Swelling     just when wearing tight socks-goes away after taking off      1. Have you been to the ER, urgent care clinic since your last visit? Hospitalized since your last visit? No     2. Have you seen or consulted any other health care providers outside of the [address] [place] [address] since your last visit? Include any pap smears or colon screening.   Yes Beverly PETERSEN

## 2021-02-03 ENCOUNTER — OFFICE VISIT (OUTPATIENT)
Dept: CARDIOLOGY CLINIC | Age: 76
End: 2021-02-03
Payer: MEDICARE

## 2021-02-03 VITALS
BODY MASS INDEX: 21.67 KG/M2 | DIASTOLIC BLOOD PRESSURE: 70 MMHG | RESPIRATION RATE: 16 BRPM | WEIGHT: 160 LBS | HEIGHT: 72 IN | HEART RATE: 50 BPM | SYSTOLIC BLOOD PRESSURE: 128 MMHG | TEMPERATURE: 98.8 F | OXYGEN SATURATION: 100 %

## 2021-02-03 DIAGNOSIS — E78.5 DYSLIPIDEMIA: ICD-10-CM

## 2021-02-03 DIAGNOSIS — I65.23 CAROTID STENOSIS, BILATERAL: ICD-10-CM

## 2021-02-03 DIAGNOSIS — Z86.79 S/P ABLATION OF ATRIAL FIBRILLATION: ICD-10-CM

## 2021-02-03 DIAGNOSIS — I48.0 PAROXYSMAL ATRIAL FIBRILLATION (HCC): Primary | ICD-10-CM

## 2021-02-03 DIAGNOSIS — Z98.890 S/P ABLATION OF ATRIAL FIBRILLATION: ICD-10-CM

## 2021-02-03 DIAGNOSIS — I77.810 DILATED AORTIC ROOT (HCC): ICD-10-CM

## 2021-02-03 PROCEDURE — 99214 OFFICE O/P EST MOD 30 MIN: CPT | Performed by: INTERNAL MEDICINE

## 2021-02-03 PROCEDURE — G8420 CALC BMI NORM PARAMETERS: HCPCS | Performed by: INTERNAL MEDICINE

## 2021-02-03 PROCEDURE — G8510 SCR DEP NEG, NO PLAN REQD: HCPCS | Performed by: INTERNAL MEDICINE

## 2021-02-03 PROCEDURE — G8427 DOCREV CUR MEDS BY ELIG CLIN: HCPCS | Performed by: INTERNAL MEDICINE

## 2021-02-03 PROCEDURE — 93000 ELECTROCARDIOGRAM COMPLETE: CPT | Performed by: INTERNAL MEDICINE

## 2021-02-03 PROCEDURE — 1101F PT FALLS ASSESS-DOCD LE1/YR: CPT | Performed by: INTERNAL MEDICINE

## 2021-02-03 PROCEDURE — 3017F COLORECTAL CA SCREEN DOC REV: CPT | Performed by: INTERNAL MEDICINE

## 2021-02-03 PROCEDURE — G8536 NO DOC ELDER MAL SCRN: HCPCS | Performed by: INTERNAL MEDICINE

## 2021-02-03 RX ORDER — MELATONIN
1000 DAILY
COMMUNITY

## 2021-02-03 NOTE — PROGRESS NOTES
Verified patient with two patient identifiers. Medications reviewed/approved by Dr. Suzi Ardon. Chief Complaint   Patient presents with    Irregular Heart Beat     ANNUAL follow up    Slow Heart Rate    Carotid Artery Stenosis     1. Have you been to the ER, urgent care clinic since your last visit? Hospitalized since your last visit?no    2. Have you seen or consulted any other health care providers outside of the [address] [place] [address] since your last visit? Include any pap smears or colon screening. Yes, Dr. Josh Austin pcp seen since last visit for r/c.

## 2021-02-03 NOTE — PROGRESS NOTES
Dereje Benoit is a 70 y.o. male is here for routine f/u. No current CV sx or complaints. S/p RFA/PVI for PAF, cardioversion to sinus bradycardia--beta blocker stopped at f/u with Dr. Koleen Boxer.  Off anticoagulation ([address] 1). Continues to see PCP. Last Echo  with LVEF 65-70, mild aortic root dilation 4.2 (stable). Carotid dopplers  with mild LICA, mod RADHA (unchanged). The patient denies chest pain/ shortness of breath, orthopnea, PND, LE edema, palpitations, syncope, presyncope or fatigue.        Patient Active Problem List    Diagnosis Date Noted    A-fib [facility]) 10/03/2018    Syncope 2018    Dilated aortic root ([facility] [address] 2016    Sinus bradycardia 2016    Carotid stenosis, bilateral     Paroxysmal atrial fibrillation (HCC)     Hyperthyroidism       North Wilder MD  Past Medical History:   Diagnosis Date    Carotid stenosis, bilateral     Hollenhorst plaque, left eye 2012    Hyperthyroidism     Paroxysmal atrial fibrillation [facility])       Past Surgical History:   Procedure Laterality Date    ECHO 2D ADULT  2011    EF 60%, Trace AI, PA 41     No Known Allergies   Family History   Problem Relation Age of Onset    No Known Problems Mother     No Known Problems Father       Social History     Socioeconomic History    Marital status:      Spouse name: Not on file    Number of children: Not on file    Years of education: Not on file    Highest education level: Not on file   Occupational History    Not on file   Social Needs    Financial resource strain: Not on file    Food insecurity     Worry: Not on file     Inability: Not on file    Transportation needs     Medical: Not on file     Non-medical: Not on file   Tobacco Use    Smoking status: Former Smoker     Types: Cigarettes     Quit date: 1970     Years since quittin.7    Smokeless tobacco: Never Used   Substance and Sexual Activity    Alcohol use: Yes     Comment: every day- 1 cocktail and glass of wine with dinner    Drug use: Not on file    Sexual activity: Not on file   Lifestyle    Physical activity     Days per week: Not on file     Minutes per session: Not on file    Stress: Not on file   Relationships    Social connections     Talks on phone: Not on file     Gets together: Not on file     Attends Bahai service: Not on file     Active member of club or organization: Not on file     Attends meetings of clubs or organizations: Not on file     Relationship status: Not on file    Intimate partner violence     Fear of current or ex partner: Not on file     Emotionally abused: Not on file     Physically abused: Not on file     Forced sexual activity: Not on file   Other Topics Concern    Not on file   Social History Narrative    Not on file      Current Outpatient Medications   Medication Sig    cholecalciferol (Vitamin D3) (1000 Units /25 mcg) tablet Take 1,000 Units by mouth daily.  methIMAzole (TAPAZOLE) 5 mg tablet Take 2.5 mg by mouth every Monday and Thursday.  OTHER Calcium,magnesium ,zinc & vitamin d3 3 tablets daily    cyanocobalamin (VITAMIN B-12) 1,000 mcg tablet Take 1,000 mcg by mouth daily. Indications: nightly    aspirin delayed-release 81 mg tablet Take  by mouth daily.  fluticasone (FLONASE) 50 mcg/Actuation nasal spray by Nasal route as needed. No current facility-administered medications for this visit. Review of Symptoms:    CONST  No weight change. No fever, chills, sweats    ENT No visual changes, URI sx, sore throat    CV  See HPI   RESP  No cough, or sputum, wheezing. Also see HPI   GI  No abdominal pain or change in bowel habits. No heartburn or dysphagia. No melena or rectal bleeding.   No dysuria, urgency, frequency, hematuria   MSKEL  No joint pain, swelling. No muscle pain. SKIN  No rash or lesions. NEURO  No headache, syncope, or seizure. No weakness, loss of sensation, or paresthesias.     PSYCH  No low mood or depression  No anxiety.    HE/LYMPH  No easy bruising, abnormal bleeding, or enlarged glands.        Physical ExamPhysical Exam:    Visit Vitals  /70 (BP 1 Location: Left upper arm, BP Patient Position: Sitting, BP Cuff Size: Adult)   Pulse (!) 50   Temp 98.8 °F (37.1 °C) (Temporal)   Resp 16   Ht 6' (1.829 m)   Wt 160 lb (72.6 kg)   SpO2 100% Comment: ra   BMI 21.70 kg/m²     Gen: NAD  HEENT:  PERRL, throat clear  Neck: no adenopathy, no thyromegaly, no JVD   Heart:  Regular,Nl S1S2,  I/VI murmur, no gallop or rub.   Lungs:  clear  Abdomen:   Soft, non-tender, bowel sounds are active.   Extremities:  No edema  Pulse: symmetric  Neuro: A&O times 3, No focal neuro deficits    Cardiographics    ECG: sinus julissa 57        Labs:   Lab Results   Component Value Date/Time    Sodium 140 09/24/2018 08:47 AM    Sodium 140 08/23/2018 06:13 AM    Sodium 141 08/22/2018 07:33 AM    Potassium 4.8 09/24/2018 08:47 AM    Potassium 3.8 08/23/2018 06:13 AM    Potassium 3.9 08/22/2018 07:33 AM    Chloride 100 09/24/2018 08:47 AM    Chloride 106 08/23/2018 06:13 AM    Chloride 103 08/22/2018 07:33 AM    CO2 28 09/24/2018 08:47 AM    CO2 29 08/23/2018 06:13 AM    CO2 33 (H) 08/22/2018 07:33 AM    Anion gap 5 08/23/2018 06:13 AM    Anion gap 5 08/22/2018 07:33 AM    Glucose 77 09/24/2018 08:47 AM    Glucose 96 08/23/2018 06:13 AM    Glucose 128 (H) 08/22/2018 07:33 AM    BUN 11 09/24/2018 08:47 AM    BUN 6 08/23/2018 06:13 AM    BUN 14 08/22/2018 07:33 AM    Creatinine 0.74 (L) 09/24/2018 08:47 AM    Creatinine 0.63 (L) 08/23/2018 06:13 AM    Creatinine 0.79 08/22/2018 07:33 AM    BUN/Creatinine ratio 15 09/24/2018 08:47 AM    BUN/Creatinine ratio 10 (L) 08/23/2018 06:13 AM    BUN/Creatinine ratio 18 08/22/2018 07:33 AM    GFR est  09/24/2018 08:47 AM    GFR est AA >60 08/23/2018 06:13 AM    GFR est AA >60 08/22/2018 07:33 AM    GFR est non-AA 93 09/24/2018 08:47 AM    GFR est non-AA >60 08/23/2018 06:13 AM    GFR est  non-AA >60 08/22/2018 07:33 AM    Calcium 9.4 09/24/2018 08:47 AM    Calcium 8.6 08/23/2018 06:13 AM    Calcium 8.9 08/22/2018 07:33 AM    Bilirubin, total 0.7 09/24/2018 08:47 AM    Bilirubin, total 1.4 (H) 08/22/2018 07:33 AM    Alk. phosphatase 56 09/24/2018 08:47 AM    Alk. phosphatase 61 08/22/2018 07:33 AM    Protein, total 6.6 09/24/2018 08:47 AM    Protein, total 7.2 08/22/2018 07:33 AM    Albumin 4.2 09/24/2018 08:47 AM    Albumin 3.8 08/22/2018 07:33 AM    Globulin 3.4 08/22/2018 07:33 AM    A-G Ratio 1.8 09/24/2018 08:47 AM    A-G Ratio 1.1 08/22/2018 07:33 AM    ALT (SGPT) 13 09/24/2018 08:47 AM    ALT (SGPT) 21 08/22/2018 07:33 AM     Lab Results   Component Value Date/Time    CK 47 08/22/2018 07:33 AM     Lab Results   Component Value Date/Time    Cholesterol, total 215 (H) 09/24/2012 07:40 AM    HDL Cholesterol 42 09/24/2012 07:40 AM    LDL, calculated 152 (H) 09/24/2012 07:40 AM    Triglyceride 107 09/24/2012 07:40 AM     No results found for this or any previous visit. Assessment:         Patient Active Problem List    Diagnosis Date Noted    A-fib [facility]) 10/03/2018    Syncope 08/22/2018    Dilated aortic root ([address] 12/14/2016    Sinus bradycardia 12/14/2016    Carotid stenosis, bilateral     Paroxysmal atrial fibrillation (HCC)     Hyperthyroidism       No current CV sx or complaints. S/p RFA/PVI for PAF, cardioversion to sinus bradycardia--beta blocker stopped at f/u with Dr. Kailee Shannon.  Off anticoagulation ([address]). Continues to see PCP. Last Echo 8/18 with LVEF 65-70, mild aortic root dilation 4.2 (stable). Carotid dopplers 0/13 with mild LICA, mod RADHA (unchanged). Plan:     Doing well with no adverse cardiac symptoms. Echo/doppler--call w/ results  Carotid dopplers   Lipids and labs followed by PCP. Continue current care and f/u in 12 months.     Reuben Kawasaki, MD

## 2021-02-16 ENCOUNTER — TELEPHONE (OUTPATIENT)
Dept: CARDIOLOGY CLINIC | Age: 76
End: 2021-02-16

## 2021-02-16 ENCOUNTER — PATIENT MESSAGE (OUTPATIENT)
Dept: CARDIOLOGY CLINIC | Age: 76
End: 2021-02-16

## 2021-02-16 NOTE — TELEPHONE ENCOUNTER
----- Message from Yola Jiménez MD sent at 2/10/2021 12:40 PM EST -----  Regarding: echo and carotid dopplers  Advise carotid dopplers unchanged--moderate plaque on right, mild plaque on left. Echo shows normal LV pumping function and valves with only mild regurg and mild aortic valve sclerosis--no stenosis. The aortic root has enlarged from 4.2 to 4.57 cm--still can watch but likely repeat Echo in one year at fu visit. Thanks [facility]    Spoke with the patient. Verified patient with two patient identifiers. Results given and questions answered. Patient verbalized understanding.

## 2021-08-18 ENCOUNTER — OFFICE VISIT (OUTPATIENT)
Dept: CARDIOLOGY CLINIC | Age: 76
End: 2021-08-18
Payer: MEDICARE

## 2021-08-18 VITALS
DIASTOLIC BLOOD PRESSURE: 69 MMHG | RESPIRATION RATE: 18 BRPM | OXYGEN SATURATION: 97 % | HEART RATE: 52 BPM | WEIGHT: 161 LBS | BODY MASS INDEX: 21.81 KG/M2 | SYSTOLIC BLOOD PRESSURE: 137 MMHG | HEIGHT: 72 IN

## 2021-08-18 DIAGNOSIS — Z86.79 S/P ABLATION OF ATRIAL FIBRILLATION: ICD-10-CM

## 2021-08-18 DIAGNOSIS — Z98.890 S/P ABLATION OF ATRIAL FIBRILLATION: ICD-10-CM

## 2021-08-18 DIAGNOSIS — I48.0 PAROXYSMAL ATRIAL FIBRILLATION (HCC): Primary | ICD-10-CM

## 2021-08-18 DIAGNOSIS — R00.1 SINUS BRADYCARDIA: ICD-10-CM

## 2021-08-18 PROCEDURE — 93005 ELECTROCARDIOGRAM TRACING: CPT | Performed by: NURSE PRACTITIONER

## 2021-08-18 PROCEDURE — G8427 DOCREV CUR MEDS BY ELIG CLIN: HCPCS | Performed by: NURSE PRACTITIONER

## 2021-08-18 PROCEDURE — G8420 CALC BMI NORM PARAMETERS: HCPCS | Performed by: NURSE PRACTITIONER

## 2021-08-18 PROCEDURE — G8536 NO DOC ELDER MAL SCRN: HCPCS | Performed by: NURSE PRACTITIONER

## 2021-08-18 PROCEDURE — 93010 ELECTROCARDIOGRAM REPORT: CPT | Performed by: NURSE PRACTITIONER

## 2021-08-18 PROCEDURE — 1101F PT FALLS ASSESS-DOCD LE1/YR: CPT | Performed by: NURSE PRACTITIONER

## 2021-08-18 PROCEDURE — 3017F COLORECTAL CA SCREEN DOC REV: CPT | Performed by: NURSE PRACTITIONER

## 2021-08-18 PROCEDURE — 99214 OFFICE O/P EST MOD 30 MIN: CPT | Performed by: NURSE PRACTITIONER

## 2021-08-18 PROCEDURE — G0463 HOSPITAL OUTPT CLINIC VISIT: HCPCS | Performed by: NURSE PRACTITIONER

## 2021-08-18 PROCEDURE — G8432 DEP SCR NOT DOC, RNG: HCPCS | Performed by: NURSE PRACTITIONER

## 2021-08-18 NOTE — PROGRESS NOTES
ELECTROPHYSIOLOGY        Subjective: Kevin Villegas is a 70 y.o. male is here for EP follow up. The patient denies chest pain/ shortness of breath, orthopnea, PND, LE edema, palpitations, syncope, presyncope or fatigue. He continues to be active day to day and remains a vegan. Follows up with Dr Estevan Booth      Patient Active Problem List    Diagnosis Date Noted   [facility]) 10/03/2018    Syncope 2018    Dilated aortic root ([address] 2016    Sinus bradycardia 2016    Carotid stenosis, bilateral     Paroxysmal atrial fibrillation ([address]     Hyperthyroidism       Michelle Roldan MD  Past Medical History:   Diagnosis Date    Cancer [facility])     skin    [facility]     Carotid stenosis, bilateral     Hollenhorst plaque, left eye 2012    Hyperthyroidism     Paroxysmal atrial fibrillation ([address]     Syncope       Past Surgical History:   Procedure Laterality Date    ECHO 2D ADULT  2011    EF 60%, Trace AI, PA 41    HX MALIGNANT SKIN LESION EXCISION  2021    bilaterally arms     No Known Allergies   Family History   Problem Relation Age of Onset    No Known Problems Mother     No Known Problems Father     negative for cardiac disease  Social History     Socioeconomic History    Marital status:      Spouse name: Not on file    Number of children: Not on file    Years of education: Not on file    Highest education level: Not on file   Tobacco Use    Smoking status: Former Smoker     Types: Cigarettes     Quit date: 1970     Years since quittin.2    Smokeless tobacco: Never Used   Vaping Use    Vaping Use: Never used   Substance and Sexual Activity    Alcohol use:  Yes     Alcohol/week: 1.0 standard drinks     Types: 1 Glasses of wine per week     Comment: every day- 1 cocktail and glass of wine with dinner    Drug use: Never     Social Determinants of Health     Financial Resource Strain:     Difficulty of Paying Living Expenses:    Food Insecurity:     Worried About Running Out of Food in the Last Year:     [address] in the Last Year:    Transportation Needs:     Lack of Transportation (Medical):  Lack of Transportation (Non-Medical):    Physical Activity:     Days of Exercise per Week:     Minutes of Exercise per Session:    Stress:     Feeling of Stress :    Social Connections:     Frequency of Communication with Friends and Family:     Frequency of Social Gatherings with Friends and Family:     Attends Judaism Services:     Active Member of Clubs or Organizations:     Attends Club or Organization Meetings:     Marital Status:      Current Outpatient Medications   Medication Sig    cholecalciferol (Vitamin D3) (1000 Units /25 mcg) tablet Take 1,000 Units by mouth daily.  methIMAzole (TAPAZOLE) 5 mg tablet Take 2.5 mg by mouth every Monday and Thursday.  OTHER Calcium,magnesium ,zinc & vitamin d3 3 tablets daily    cyanocobalamin (VITAMIN B-12) 1,000 mcg tablet Take 1,000 mcg by mouth daily. Indications: nightly    aspirin delayed-release 81 mg tablet Take  by mouth daily.  fluticasone (FLONASE) 50 mcg/Actuation nasal spray by Nasal route as needed. No current facility-administered medications for this visit. Vitals:    08/18/21 0856   BP: 137/69   Pulse: (!) 52   Resp: 18   SpO2: 97%   Weight: 161 lb (73 kg)   Height: 6' (1.829 m)       I have reviewed the nurses notes, vitals, problem list, allergy list, medical history, family, social history and medications. Review of Symptoms:  11 systems reviewed, negative other than as stated in the HPI      Physical Exam:      General: Well developed, in no acute distress. HEENT: Eyes - PERRL  Heart:  Normal S1/S2 negative S3 or S4. Regular, no murmur  Respiratory: Clear bilaterally x 4, no wheezing or rales  Extremities:  No edema, no cyanosis. Musculoskeletal: No clubbing  Neuro: A&Ox3, speech clear  Skin: No visible rashes or lesions. Non diaphoretic. No visible ulcers  Vascular: 2+ pulses symmetric in all extremities  Psych - judgement intact and orientation is wnl       Cardiographics    Ek21  Sinus bradycardia, ventricular rate 51    Results for orders placed or performed during the hospital encounter of 10/03/18   EKG, 12 LEAD, INITIAL   Result Value Ref Range    Ventricular Rate 62 BPM    Atrial Rate 62 BPM    P-R Interval 154 ms    QRS Duration 88 ms    Q-T Interval 408 ms    QTC Calculation (Bezet) 414 ms    Calculated P Axis 44 degrees    Calculated R Axis 42 degrees    Calculated T Axis 50 degrees    Diagnosis       Normal sinus rhythm  Early repolarization  When compared with ECG of 22-AUG-2018 07:44,  No significant change was found  Confirmed by Jaky Johansen ([id number]) on 10/4/2018 8:42:13 AM     Results for orders placed or performed in visit on 14   AMB POC EKG ROUTINE W/ 12 LEADS, INTER & REP    Narrative    Allison Villegas             Lab Results   Component Value Date/Time    WBC 5.1 2018 08:47 AM    HGB 12.3 (L) 2018 08:47 AM    HCT 35.9 (L) 2018 08:47 AM    PLATELET 289  08:47 AM    MCV 96 2018 08:47 AM      Lab Results   Component Value Date/Time    Sodium 140 2018 08:47 AM    Potassium 4.8 2018 08:47 AM    Chloride 100 2018 08:47 AM    CO2 28 2018 08:47 AM    Anion gap 5 2018 06:13 AM    Glucose 77 2018 08:47 AM    BUN 11 2018 08:47 AM    Creatinine 0.74 (L) 2018 08:47 AM    BUN/Creatinine ratio 15 2018 08:47 AM    GFR est  2018 08:47 AM    GFR est non-AA 93 2018 08:47 AM    Calcium 9.4 2018 08:47 AM    Bilirubin, total 0.7 2018 08:47 AM    Alk.  phosphatase 56 2018 08:47 AM    Protein, total 6.6 2018 08:47 AM    Albumin 4.2 2018 08:47 AM    Globulin 3.4 2018 07:33 AM    A-G Ratio 1.8 2018 08:47 AM    ALT (SGPT) 13 2018 08:47 AM      Lab Results   Component Value Date/Time    TSH 1.89 08/22/2018 07:30 AM           Assessment:           ICD-10-CM ICD-9-CM    1. Paroxysmal atrial fibrillation (HCC)  I48.0 427.31 AMB POC EKG ROUTINE W/ 12 LEADS, INTER & REP   2. S/P ablation of atrial fibrillation  Z98.890 V45.89     Z86.79     3. Sinus bradycardia  R00.1 427.89      Orders Placed This Encounter    AMB POC EKG ROUTINE W/ 12 LEADS, INTER & REP     Order Specific Question:   Reason for Exam:     Answer:   routine        Plan:     Mr. Brian Leija S/P PVI of all 4 PVs, cardioversion to sinus rhythm 10/3/18. He continues to feel well.  He remains in sinus bradycardia off bb and [address]. He is a chadsvasc score of 1 on ASA. Mr Leela Greer has done well. We discussed follow up in 2 years when his CHADSVASC increased to 2 (age). Pt verbalizes understanding and is in agreement with the plan. Follow up with Dr Mackenzie Souza as planned. Addressed all patient questions and concerns at this visit. Thank you for allowing me to participate in Ratna Arce 's care.       Varsha Randle NP

## 2021-08-18 NOTE — PROGRESS NOTES
Chief Complaint   Patient presents with    Irregular Heart Beat     annual follow up     1. Have you been to the ER, urgent care clinic since your last visit? Hospitalized since your last visit? No    2. Have you seen or consulted any other health care providers outside of the [address] [place] [address] since your last visit? Include any pap smears or colon screening.  Yes Franko Nunez  Dermatologist and Dr. Akshat Delgado endocrinology

## 2021-11-05 ENCOUNTER — APPOINTMENT (OUTPATIENT)
Dept: GENERAL RADIOLOGY | Age: 76
End: 2021-11-05
Attending: EMERGENCY MEDICINE
Payer: MEDICARE

## 2021-11-05 ENCOUNTER — HOSPITAL ENCOUNTER (EMERGENCY)
Age: 76
Discharge: HOME | End: 2021-11-05
Attending: EMERGENCY MEDICINE
Payer: MEDICARE

## 2021-11-05 VITALS
BODY MASS INDEX: 22.35 KG/M2 | TEMPERATURE: 98 F | WEIGHT: 165 LBS | HEIGHT: 72 IN | RESPIRATION RATE: 20 BRPM | OXYGEN SATURATION: 97 % | SYSTOLIC BLOOD PRESSURE: 146 MMHG | DIASTOLIC BLOOD PRESSURE: 68 MMHG | HEART RATE: 64 BPM

## 2021-11-05 DIAGNOSIS — M25.561 ACUTE PAIN OF RIGHT KNEE: Primary | ICD-10-CM

## 2021-11-05 PROCEDURE — 99282 EMERGENCY DEPT VISIT SF MDM: CPT

## 2021-11-05 PROCEDURE — 73562 X-RAY EXAM OF KNEE 3: CPT

## 2021-11-05 NOTE — ED PROVIDER NOTES
EMERGENCY DEPARTMENT HISTORY AND PHYSICAL EXAM          Date: 11/5/2021  Patient Name: Rosa Landers    History of Presenting Illness     Chief Complaint   Patient presents with    Knee Pain       History Provided By: Patient    HPI: Rosa Landers is a 70 y.o. male, pmhx A. fib, carotid stenosis, hypothyroidism, who presents ambulatory to the ED c/o knee pain    Patient explains that he started with some knee pain yesterday in the afternoon which seemed to progress through the night and into the morning and now is much worse. He took some Tylenol last night and then took 2 again this morning around 2 AM with minimal relief. Currently the pain is 5 at rest but it can get up to a 6 or 7 when he stand and ambulating. He has not done anything strenuous or had any injuries with twisting or falls. He has not had previous symptoms of this pain. He denies fevers, chills and redness as well as any pain in his hip or ankle. PCP: Gretchen Koo MD    Allergies: None known  Social Hx: -tobacco, -vaping, +EtOH, -Illicit Drugs; There are no other complaints, changes, or physical findings at this time. Current Outpatient Medications   Medication Sig Dispense Refill    cholecalciferol (Vitamin D3) (1000 Units /25 mcg) tablet Take 1,000 Units by mouth daily.  methIMAzole (TAPAZOLE) 5 mg tablet Take 2.5 mg by mouth every Monday and Thursday.  OTHER Calcium,magnesium ,zinc & vitamin d3 3 tablets daily      cyanocobalamin (VITAMIN B-12) 1,000 mcg tablet Take 1,000 mcg by mouth daily. Indications: nightly      aspirin delayed-release 81 mg tablet Take  by mouth daily.  fluticasone (FLONASE) 50 mcg/Actuation nasal spray by Nasal route as needed.          Past History     Past Medical History:  Past Medical History:   Diagnosis Date    [facility]     skin    Carotid artery disease [facility]     Carotid stenosis, bilateral     Hollenhorst plaque, left eye 9/2012    Hyperthyroidism     [facility]ox[facility]     Syncope        Past Surgical History:  Past Surgical History:   Procedure Laterality Date    ECHO 2D ADULT  2011    EF 60%, Trace YUL[place]NA, PA 41    HX MALIGNANT SKIN LESION EXCISION  2021    bilaterally arms       Family History:  Family History   Problem Relation Age of Onset    No Known Problems Mother     No Known Problems Father        Social History:  Social History     Tobacco Use    Smoking status: Former Smoker     Types: Cigarettes     Quit date: 1970     Years since quittin.4    Smokeless tobacco: Never Used   Vaping Use    Vaping Use: Never used   Substance Use Topics    Alcohol use: Yes     Alcohol/week: 1.0 standard drink     Types: 1 Glasses of wine per week     Comment: every day- 1 cocktail and glass of wine with dinner    Drug use: Never       Allergies:  No Known Allergies      Review of Systems   Review of Systems   Constitutional: Negative for activity change, appetite change, chills, fever and unexpected weight change. HENT: Negative for congestion. Eyes: Negative for pain and visual disturbance. Respiratory: Negative for cough and shortness of breath. Cardiovascular: Negative for chest pain. Gastrointestinal: Negative for abdominal pain, diarrhea, nausea and vomiting. Genitourinary: Negative for dysuria. Musculoskeletal: Positive for arthralgias and gait problem (Pain increases with ambulation). Negative for back pain. Skin: Negative for rash. Neurological: Negative for headaches. Physical Exam   Physical Exam  Constitutional:       Appearance: Normal appearance. He is normal weight. He is not ill-appearing or toxic-appearing. Comments: This is a tall, thin elderly male currently in minimal acute distress   HENT:      Head: Normocephalic and atraumatic. Nose: Nose normal.   Eyes:      Extraocular Movements: Extraocular movements intact. Pupils: Pupils are equal, round, and reactive to light. Cardiovascular:      Rate and Rhythm: Normal rate. Pulses: Normal pulses. Pulmonary:      Effort: Pulmonary effort is normal. No respiratory distress. Musculoskeletal:         General: Swelling (mild swelling along the medial aspect of the right knee) and tenderness (Along the medial joint line) present. Right lower leg: No edema. Left lower leg: No edema. Skin:     General: Skin is warm and dry. Capillary Refill: Capillary refill takes less than 2 seconds. Coloration: Skin is not pale. Findings: No bruising or erythema. Neurological:      General: No focal deficit present. Mental Status: He is alert and oriented to person, place, and time. Diagnostic Study Results     Labs -   No results found for this or any previous visit (from the past 12 hour(s)). Radiologic Studies -   XR KNEE RT 3 V    (Results Pending)     CT Results  (Last 48 hours)    None        CXR Results  (Last 48 hours)    None            Medical Decision Making   I am the first provider for this patient. I reviewed the vital signs, available nursing notes, past medical history, past surgical history, family history and social history. Vital Signs-Reviewed the patient's vital signs. Patient Vitals for the past 12 hrs:   Temp Pulse Resp BP SpO2   11/05/21 0640 98 °F (36.7 °C) 64 20 (!) 146/68 97 %       Pulse Oximetry Analysis - 98% on RA    Records Reviewed: Nursing Notes and Old Medical Records    Provider Notes (Medical Decision Making):   MDM: Elderly male with a history of atrial fibrillation on aspirin presenting for right knee pain which appears to be atraumatic in nature. Currently hemodynamically stable without any concern for septic joint or gout. Plain film imaging to be obtained at this time but I do not feel IV placement or labs are warranted. Patient to follow-up with orthopedics for evaluation of arthritis. ED Course:   Initial assessment performed.  The patients presenting problems have been discussed, and they are in agreement with the care plan formulated and outlined with them. I have encouraged them to ask questions as they arise throughout their visit. PROGRESS NOTE:  7 AM  Pt signed out to Dr. Jacquelyn Redman who will follow his results and disposition with treatment based on findings. Discharge note:  Pt re-evaluated and noted to be feeling better, ready for discharge. Updated pt on all final imaging findings. Will follow up as instructed. All questions have been answered, pt voiced understanding and agreement with plan. Specific return precautions provided as well as instructions to return to the ED should sx worsen at any time. Vital signs stable for discharge. Critical Care Time:   0      Diagnosis     Clinical Impression:   1. Acute pain of right knee        PLAN:  1. Current Discharge Medication List        2. Follow-up Information     Follow up With Specialties Details Why Yosi Mcneil MD Internal Medicine  As needed [address] [place] [address]      Carrington Gaytan MD Orthopedic Surgery Schedule an appointment as soon as possible for a visit   [facility]  [address] [id number]  [phone]      [address] Emergency Medicine  If symptoms worsen with redness, warmth, fever or inability to ambulate [address] [phone]        Return to ED if worse     Disposition:  Home       Please note, this dictation was completed with [employer], the computer voice recognition software. Quite often unanticipated grammatical, syntax, homophones, and other interpretive errors are inadvertently transcribed by the computer software. Please disregard these errors. Please excuse any errors that have escaped final proof reading.

## 2021-11-08 ENCOUNTER — OFFICE VISIT (OUTPATIENT)
Dept: SURGERY | Age: 76
End: 2021-11-08
Payer: MEDICARE

## 2021-11-08 VITALS
WEIGHT: 166 LBS | DIASTOLIC BLOOD PRESSURE: 66 MMHG | SYSTOLIC BLOOD PRESSURE: 139 MMHG | HEIGHT: 72 IN | BODY MASS INDEX: 22.48 KG/M2 | HEART RATE: 57 BPM

## 2021-11-08 DIAGNOSIS — R19.5 POSITIVE COLORECTAL CANCER SCREENING USING COLOGUARD TEST: Primary | ICD-10-CM

## 2021-11-08 PROCEDURE — 99203 OFFICE O/P NEW LOW 30 MIN: CPT | Performed by: SURGERY

## 2021-11-08 PROCEDURE — G8420 CALC BMI NORM PARAMETERS: HCPCS | Performed by: SURGERY

## 2021-11-08 PROCEDURE — G8427 DOCREV CUR MEDS BY ELIG CLIN: HCPCS | Performed by: SURGERY

## 2021-11-08 PROCEDURE — 3017F COLORECTAL CA SCREEN DOC REV: CPT | Performed by: SURGERY

## 2021-11-08 PROCEDURE — G8510 SCR DEP NEG, NO PLAN REQD: HCPCS | Performed by: SURGERY

## 2021-11-08 PROCEDURE — G8536 NO DOC ELDER MAL SCRN: HCPCS | Performed by: SURGERY

## 2021-11-08 PROCEDURE — 1101F PT FALLS ASSESS-DOCD LE1/YR: CPT | Performed by: SURGERY

## 2021-11-08 NOTE — PATIENT INSTRUCTIONS
Colon Cancer Screening: Care Instructions  Overview     Colorectal cancer occurs in the colon or rectum. That's the lower part of your digestive system. It often starts in small growths called polyps in the colon or rectum. Polyps are usually found with screening tests. Depending on the type of test, any polyps found may be removed during the tests. Colorectal cancer usually does not cause symptoms at first. But regular tests can help find it early, before it spreads and becomes harder to treat. Your risk for colorectal cancer gets higher as you get older. Experts recommend starting screening at age 39 for people who are at average risk. Talk with your doctor about your risk and when to start and stop screening. You may have one of several tests. Follow-up care is a key part of your treatment and safety. Be sure to make and go to all appointments, and call your doctor if you are having problems. It's also a good idea to know your test results and keep a list of the medicines you take. What are the main screening tests for colon cancer? The screening tests are:  Stool tests. These include the guaiac fecal occult blood test (gFOBT), the fecal immunochemical test (FIT), and the combined fecal immunochemical test and stool DNA test (FIT-DNA). These tests check stool samples for signs of cancer. If your test is positive, you will need to have a colonoscopy. Sigmoidoscopy. This test lets your doctor look at the lining of your rectum and the lowest part of your colon. Your doctor uses a lighted tube called a sigmoidoscope. This test can't find cancers or polyps in the upper part of your colon. In some cases, polyps that are found can be removed. But if your doctor finds polyps, you will need to have a colonoscopy to check the upper part of your colon. Colonoscopy. This test lets your doctor look at the lining of your rectum and your entire colon. The doctor uses a thin, flexible tool called a colonoscope. It can also be used to remove polyps or get a tissue sample (biopsy). A less common test is CT colonography (CTC). It's also called virtual colonoscopy. Who should be screened for colorectal cancer? Your risk for colorectal cancer gets higher as you get older. Experts recommend starting screening at age 39 for people who are at average risk. Talk with your doctor about your risk and when to start and stop screening. How often you need screening depends on the type of test you get:  Stool tests. Every year for FIT or gFOBT. Every 1 to 3 years for sDNA, also called FIT-DNA. Tests that look inside the colon. Every 5 years for sigmoidoscopy. (If you do the FIT test every year, you can get this test every 10 years.)   Every 5 years for CT colonography (virtual colonoscopy). Every 10 years for colonoscopy. Experts agree that people at higher risk may need to be tested sooner and more often. This includes people who have a strong family history of colon cancer. Talk to your doctor about which test is best for you and when to be tested. When should you call for help? Watch closely for changes in your health, and be sure to contact your doctor if:    · You have any changes in your bowel habits.     · You have any problems. Where can you learn more? Go to http://www.gray.com/  Enter M541 in the search box to learn more about \"Colon Cancer Screening: Care Instructions. \"  Current as of: December 17, 2020               Content Version: 13.0  © 1296-2466 [employer]. Care instructions adapted under license by [employer] (which disclaims liability or warranty for this information). If you have questions about a medical condition or this instruction, always ask your healthcare professional. [place] [address] any warranty or liability for your use of this information.

## 2021-11-08 NOTE — LETTER
11/8/2021    Patient: Lazarus Cedar   YOB: 1945   Date of Visit: 11/8/2021     Yvrose Roberson MD  [facility] [phone]  Via Fax: [phone]    Dear Yvrose Roberson MD,      Thank you for referring Mr. Marylee Royals to [address] [place] [address] for evaluation. My notes for this consultation are attached. If you have questions, please do not hesitate to call me. I look forward to following your patient along with you.       Sincerely,    Clement Hernandez MD

## 2021-11-08 NOTE — PROGRESS NOTES
Amanda Mitchell is a 70 y.o. male who presents today with the following:  Chief Complaint   Patient presents with    New Patient    Colon Cancer Screening       HPI    24-year-old male who presents as a referral from Yanira Cain for possible colonoscopy in light of a positive Cologuard. This apparently was performed back on July of this year. He believes he had a colonoscopy over 15 years ago. He is unsure what the findings were believes he may have found some polyps. He has no family history of colon cancer. He denies any melena or hematochezia or diarrhea or constipation. He has had no significant abdominal pain or unexpected weight loss. He denies any change in the caliber of his stool. He has had no prior abdominal surgeries although he has had a tonsillectomy and Mohs surgery on basal cells on his face and arms. He does not smoke tobacco.  He will have some beer in the evenings and occasionally have a glass of wine. He does have a history of A. fib he is status post ablation back in 2018. He does take an 81 mg aspirin a day. He is on a vegan diet.   Past Medical History:   Diagnosis Date    Cancer [facility])     skin    Carotid artery disease (HCC)     Carotid stenosis, bilateral     Hollenhorst plaque, left eye 2012    Hyperthyroidism     Paroxysmal atrial fibrillation (HCC)     Syncope        Past Surgical History:   Procedure Laterality Date    ECHO 2D ADULT  2011    EF 60%, Trace ALVARO BRIDGES 41    HX MALIGNANT SKIN LESION EXCISION  2021    bilaterally arms       Social History     Socioeconomic History    Marital status:      Spouse name: Not on file    Number of children: Not on file    Years of education: Not on file    Highest education level: Not on file   Occupational History    Not on file   Tobacco Use    Smoking status: Former Smoker     Types: Cigarettes     Quit date: 1970     Years since quittin.4    Smokeless tobacco: Never Used Vaping Use    Vaping Use: Never used   Substance and Sexual Activity    Alcohol use: Yes     Alcohol/week: 1.0 standard drink     Types: 1 Glasses of wine per week     Comment: every day- 1 cocktail and glass of wine with dinner    Drug use: Never    Sexual activity: Not on file   Other Topics Concern    Not on file   Social History Narrative    Not on file     Social Determinants of Health     Financial Resource Strain:     Difficulty of Paying Living Expenses: Not on file   Food Insecurity:     Worried About Running Out of Food in the Last Year: Not on file    Maritza of Food in the Last Year: Not on file   Transportation Needs:     Lack of Transportation (Medical): Not on file    Lack of Transportation (Non-Medical): Not on file   Physical Activity:     Days of Exercise per Week: Not on file    Minutes of Exercise per Session: Not on file   Stress:     Feeling of Stress : Not on file   Social Connections:     Frequency of Communication with Friends and Family: Not on file    Frequency of Social Gatherings with Friends and Family: Not on file    Attends Restoration Services: Not on file    Active Member of [address] [place] [address] or Organizations: Not on file    Attends Club or Organization Meetings: Not on file    Marital Status: Not on file   Intimate Partner Violence:     Fear of Current or Ex-Partner: Not on file    Emotionally Abused: Not on file    Physically Abused: Not on file    Sexually Abused: Not on file   Housing Stability:     Unable to Pay for Housing in the Last Year: Not on file    Number of [place] in the Last Year: Not on file    Unstable Housing in the Last Year: Not on file       Family History   Problem Relation Age of Onset    No Known Problems Mother     No Known Problems Father        No Known Allergies    Current Outpatient Medications   Medication Sig    cholecalciferol (Vitamin D3) (1000 Units /25 mcg) tablet Take 1,000 Units by mouth daily.     methIMAzole (TAPAZOLE) 5 mg tablet Take 2.5 mg by mouth every Monday and Thursday.  OTHER Calcium,magnesium ,zinc & vitamin d3 3 tablets daily    cyanocobalamin (VITAMIN B-12) 1,000 mcg tablet Take 1,000 mcg by mouth daily. Indications: nightly    aspirin delayed-release 81 mg tablet Take  by mouth daily.  fluticasone (FLONASE) 50 mcg/Actuation nasal spray by Nasal route as needed. No current facility-administered medications for this visit. The above histories, medications and allergies have been reviewed. ROS    Visit Vitals  /66   Pulse (!) 57   Ht 6' (1.829 m)   Wt 166 lb (75.3 kg)   BMI 22.51 kg/m²     Physical Exam  Constitutional:       Appearance: Normal appearance. Neurological:      Mental Status: He is alert. 1. Positive colorectal cancer screening using Cologuard test  We had a discussion concerning the significance of a positive Cologuard. According to Cologuard Stata 4% of these patients have an invasive cancer. Approximately an additional 45% have some findings including adenomatous polyps of varying sizes and degrees. He understands that the recommendation is for any positive Cologuard to proceed with a structural exam such as colonoscopy which is what I have recommended. He would like to consider this. He is aware that I cannot comment on what is or is not present in his colon without further evaluation. We discussed colonoscopy in detail including its benefits and risks. Again I have recommended that he consider undergoing this procedure in light of the fact that he has had a positive Cologuard. He will reach out if he decides to proceed with colonoscopy. Follow-up and Dispositions    · Return if he decides to proceed with scheduling of colonoscopy.          Sohail Ford MD

## 2021-12-17 ENCOUNTER — TRANSCRIBE ORDER (OUTPATIENT)
Dept: SCHEDULING | Age: 76
End: 2021-12-17

## 2021-12-17 DIAGNOSIS — S83.241A ACUTE MEDIAL MENISCUS TEAR OF RIGHT KNEE: Primary | ICD-10-CM

## 2021-12-17 DIAGNOSIS — M25.561 RIGHT KNEE PAIN: ICD-10-CM

## 2021-12-22 ENCOUNTER — HOSPITAL ENCOUNTER (OUTPATIENT)
Dept: MRI IMAGING | Age: 76
Discharge: HOME | End: 2021-12-22
Attending: ORTHOPAEDIC SURGERY
Payer: MEDICARE

## 2021-12-22 DIAGNOSIS — M25.561 RIGHT KNEE PAIN: ICD-10-CM

## 2021-12-22 DIAGNOSIS — S83.241A ACUTE MEDIAL MENISCUS TEAR OF RIGHT KNEE: ICD-10-CM

## 2021-12-22 PROCEDURE — 73721 MRI JNT OF LWR EXTRE W/O DYE: CPT

## 2021-12-23 ENCOUNTER — HOSPITAL ENCOUNTER (EMERGENCY)
Age: 76
Discharge: HOME | End: 2021-12-23
Attending: EMERGENCY MEDICINE
Payer: MEDICARE

## 2021-12-23 ENCOUNTER — TELEPHONE (OUTPATIENT)
Dept: CARDIOLOGY CLINIC | Age: 76
End: 2021-12-23

## 2021-12-23 ENCOUNTER — APPOINTMENT (OUTPATIENT)
Dept: GENERAL RADIOLOGY | Age: 76
End: 2021-12-23
Attending: EMERGENCY MEDICINE
Payer: MEDICARE

## 2021-12-23 VITALS
BODY MASS INDEX: 22.35 KG/M2 | OXYGEN SATURATION: 98 % | HEART RATE: 69 BPM | HEIGHT: 72 IN | RESPIRATION RATE: 18 BRPM | TEMPERATURE: 97.9 F | WEIGHT: 165 LBS | SYSTOLIC BLOOD PRESSURE: 133 MMHG | DIASTOLIC BLOOD PRESSURE: 57 MMHG

## 2021-12-23 DIAGNOSIS — R00.2 PALPITATIONS: Primary | ICD-10-CM

## 2021-12-23 LAB
ANION GAP SERPL CALC-SCNC: 11 MMOL/L (ref 5–15)
ATRIAL RATE: 63 BPM
BASOPHILS # BLD: 0.1 K/UL (ref 0–0.1)
BASOPHILS NFR BLD: 1 % (ref 0–1)
BUN SERPL-MCNC: 13 MG/DL (ref 6–20)
BUN/CREAT SERPL: 15 (ref 12–20)
CALCIUM SERPL-MCNC: 9.4 MG/DL (ref 8.5–10.1)
CALCULATED P AXIS, ECG09: 55 DEGREES
CALCULATED R AXIS, ECG10: 31 DEGREES
CALCULATED T AXIS, ECG11: 63 DEGREES
CHLORIDE SERPL-SCNC: 100 MMOL/L (ref 97–108)
CO2 SERPL-SCNC: 27 MMOL/L (ref 21–32)
COMMENT, HOLDF: NORMAL
CREAT SERPL-MCNC: 0.89 MG/DL (ref 0.7–1.3)
DIAGNOSIS, 93000: NORMAL
DIFFERENTIAL METHOD BLD: ABNORMAL
EOSINOPHIL # BLD: 0.3 K/UL (ref 0–0.4)
EOSINOPHIL NFR BLD: 4 % (ref 0–7)
ERYTHROCYTE [DISTWIDTH] IN BLOOD BY AUTOMATED COUNT: 11.6 % (ref 11.5–14.5)
GLUCOSE SERPL-MCNC: 101 MG/DL (ref 65–100)
HCT VFR BLD AUTO: 37.4 % (ref 36.6–50.3)
HGB BLD-MCNC: 12.9 G/DL (ref 12.1–17)
IMM GRANULOCYTES # BLD AUTO: 0 K/UL (ref 0–0.04)
IMM GRANULOCYTES NFR BLD AUTO: 0 % (ref 0–0.5)
LYMPHOCYTES # BLD: 1.7 K/UL (ref 0.8–3.5)
LYMPHOCYTES NFR BLD: 26 % (ref 12–49)
MAGNESIUM SERPL-MCNC: 2.2 MG/DL (ref 1.6–2.4)
MCH RBC QN AUTO: 32.7 PG (ref 26–34)
MCHC RBC AUTO-ENTMCNC: 34.5 G/DL (ref 30–36.5)
MCV RBC AUTO: 94.9 FL (ref 80–99)
MONOCYTES # BLD: 0.6 K/UL (ref 0–1)
MONOCYTES NFR BLD: 9 % (ref 5–13)
NEUTS SEG # BLD: 3.9 K/UL (ref 1.8–8)
NEUTS SEG NFR BLD: 60 % (ref 32–75)
NRBC # BLD: 0 K/UL (ref 0–0.01)
NRBC BLD-RTO: 0 PER 100 WBC
P-R INTERVAL, ECG05: 150 MS
PLATELET # BLD AUTO: 288 K/UL (ref 150–400)
PMV BLD AUTO: 9.4 FL (ref 8.9–12.9)
POTASSIUM SERPL-SCNC: 4.8 MMOL/L (ref 3.5–5.1)
Q-T INTERVAL, ECG07: 384 MS
QRS DURATION, ECG06: 92 MS
QTC CALCULATION (BEZET), ECG08: 392 MS
RBC # BLD AUTO: 3.94 M/UL (ref 4.1–5.7)
SAMPLES BEING HELD,HOLD: NORMAL
SODIUM SERPL-SCNC: 138 MMOL/L (ref 136–145)
TROPONIN-HIGH SENSITIVITY: 5 NG/L (ref 0–76)
VENTRICULAR RATE, ECG03: 63 BPM
WBC # BLD AUTO: 6.6 K/UL (ref 4.1–11.1)

## 2021-12-23 PROCEDURE — 99283 EMERGENCY DEPT VISIT LOW MDM: CPT

## 2021-12-23 PROCEDURE — 83735 ASSAY OF MAGNESIUM: CPT

## 2021-12-23 PROCEDURE — 85025 COMPLETE CBC W/AUTO DIFF WBC: CPT

## 2021-12-23 PROCEDURE — 93005 ELECTROCARDIOGRAM TRACING: CPT

## 2021-12-23 PROCEDURE — 36415 COLL VENOUS BLD VENIPUNCTURE: CPT

## 2021-12-23 PROCEDURE — 80048 BASIC METABOLIC PNL TOTAL CA: CPT

## 2021-12-23 PROCEDURE — 84484 ASSAY OF TROPONIN QUANT: CPT

## 2021-12-23 PROCEDURE — 71045 X-RAY EXAM CHEST 1 VIEW: CPT

## 2021-12-23 NOTE — TELEPHONE ENCOUNTER
Pt called in regards to letting Ekta know that he has gone back into afib. Monday he experienced extreme dizziness, and has since been taken it easy.     Callback is [phone]    Thanks  Ranjith Montoya

## 2021-12-23 NOTE — ED PROVIDER NOTES
As tolerated   EMERGENCY DEPARTMENT HISTORY AND PHYSICAL EXAM      Date: 12/23/2021  Patient Name: Mario Villa    History of Presenting Illness     Chief Complaint   Patient presents with    Irregular Heart Beat       History Provided By: Patient    HPI: Mario Villa, 78 y.o. male with PMHx significant for paroxysmal A. fib,, presents ambulatory to the ED with cc of palpitations. Patient reports on Monday he was doing yard work when he became rather acutely dizzy and lightheaded. After symptoms did not resolve he sat down and broke out in a cold sweat and had some chills. He felt better on Tuesday. Yesterday he started having some intermittent palpitations and felt like his heart was racing. He felt that this was similar to when he had A. fib in the past.  He had an ablation with Dr. Nini Talamantes in 2018 is not had any significant events since. He checked his pulse during these episodes of palpitations and felt that it was fast and irregular. He called his cardiologist office this morning when he had another episode of palpitations. They recommended he go to his local cardiologist Dr. Eric Swann. They could not fit a man and advised him to come to the ER for evaluation. He currently is asymptomatic and does not have any palpitations. He denies any chest pain or shortness of breath. He denies any nausea, vomiting or diarrhea or abdominal pain. He currently is not on any medications other than daily baby aspirin. Elliott Dempsey PMHx: Significant for paroxysmal A. fib. PSHx: No pertinent surgical history. Social Hx: Former smoker. Quit in 1970. Usually has a glass of wine with dinner. There are no other complaints, changes, or physical findings at this time. PCP: Carlos Bloom MD    No current facility-administered medications on file prior to encounter.      Current Outpatient Medications on File Prior to Encounter   Medication Sig Dispense Refill    cholecalciferol (Vitamin D3) (1000 Units /25 mcg) tablet Take 1,000 Units by mouth daily.  methIMAzole (TAPAZOLE) 5 mg tablet Take 2.5 mg by mouth every Monday and Thursday.  OTHER Calcium,magnesium ,zinc & vitamin d3 3 tablets daily      cyanocobalamin (VITAMIN B-12) 1,000 mcg tablet Take 1,000 mcg by mouth daily. Indications: nightly      aspirin delayed-release 81 mg tablet Take  by mouth daily.  fluticasone (FLONASE) 50 mcg/Actuation nasal spray by Nasal route as needed. Past History     Past Medical History:  Past Medical History:   Diagnosis Date    Cancer ([facility] [address]     skin    [facility]otid artery disease ([facility] [address]     Carotid stenosis, bilateral     Hollenhorst plaque, left eye 2012    Hyperthyroidism     Paroxysmal atrial fibrillation (HCC)     Syncope        Past Surgical History:  Past Surgical History:   Procedure Laterality Date    ECHO 2D ADULT  2011    EF 60%, Trace AI, PA 41    HX MALIGNANT SKIN LESION EXCISION  2021    bilaterally arms       Family History:  Family History   Problem Relation Age of Onset    No Known Problems Mother     No Known Problems Father        Social History:  Social History     Tobacco Use    Smoking status: Former Smoker     Types: Cigarettes     Quit date: 1970     Years since quittin.5    Smokeless tobacco: Never Used   Vaping Use    Vaping Use: Never used   Substance Use Topics    Alcohol use: Yes     Alcohol/week: 1.0 standard drink     Types: 1 Glasses of wine per week     Comment: every day- 1 cocktail and glass of wine with dinner    Drug use: Never       Allergies:  No Known Allergies      Review of Systems   Review of Systems   Constitutional: Positive for chills and fatigue. Negative for activity change and fever. HENT: Negative for congestion and sore throat. Eyes: Negative for pain and redness. Respiratory: Negative for cough, chest tightness and shortness of breath. Cardiovascular: Positive for palpitations. Negative for chest pain.    Gastrointestinal: Negative for abdominal pain, diarrhea, nausea and vomiting. Genitourinary: Negative for dysuria, frequency and urgency. Musculoskeletal: Negative for back pain and neck pain. Skin: Negative for rash. Neurological: Negative for syncope, light-headedness and headaches. Psychiatric/Behavioral: Negative for confusion. All other systems reviewed and are negative. Physical Exam   Physical Exam  Vitals and nursing note reviewed. Constitutional:       General: He is not in acute distress. Appearance: He is well-developed. He is not diaphoretic. Comments: Calm and Pleasant white male. HENT:      Head: Normocephalic. Nose: Nose normal.      Mouth/Throat:      Pharynx: No oropharyngeal exudate. Eyes:      General: No scleral icterus. Conjunctiva/sclera: Conjunctivae normal.      Pupils: Pupils are equal, round, and reactive to light. Neck:      Thyroid: No thyromegaly. Vascular: No JVD. Trachea: No tracheal deviation. Cardiovascular:      Rate and Rhythm: Normal rate and regular rhythm. Heart sounds: No murmur heard. No friction rub. No gallop. Pulmonary:      Effort: Pulmonary effort is normal. No respiratory distress. Breath sounds: Normal breath sounds. No stridor. No wheezing or rales. Abdominal:      General: Bowel sounds are normal. There is no distension. Palpations: Abdomen is soft. Tenderness: There is no abdominal tenderness. There is no guarding or rebound. Musculoskeletal:         General: Normal range of motion. Cervical back: Normal range of motion and neck supple. Lymphadenopathy:      Cervical: No cervical adenopathy. Skin:     General: Skin is warm and dry. Findings: No erythema or rash. Neurological:      Mental Status: He is alert and oriented to person, place, and time. Cranial Nerves: No cranial nerve deficit. Motor: No abnormal muscle tone.       Coordination: Coordination normal.   Psychiatric: Behavior: Behavior normal.             Diagnostic Study Results     Labs -     Recent Results (from the past 12 hour(s))   EKG, 12 LEAD, INITIAL    Collection Time: 12/23/21 12:18 PM   Result Value Ref Range    Ventricular Rate 63 BPM    Atrial Rate 63 BPM    P-R Interval 150 ms    QRS Duration 92 ms    Q-T Interval 384 ms    QTC Calculation (Bezet) 392 ms    Calculated P Axis 55 degrees    Calculated R Axis 31 degrees    Calculated T Axis 63 degrees    Diagnosis       Normal sinus rhythm  Normal ECG  When compared with ECG of 04-OCT-2018 03:52,  No significant change was found     CBC WITH AUTOMATED DIFF    Collection Time: 12/23/21 12:51 PM   Result Value Ref Range    WBC 6.6 4.1 - 11.1 K/uL    RBC 3.94 (L) 4.10 - 5.70 M/uL    HGB 12.9 12.1 - 17.0 g/dL    HCT 37.4 36.6 - 50.3 %    MCV 94.9 80.0 - 99.0 FL    MCH 32.7 26.0 - 34.0 PG    MCHC 34.5 30.0 - 36.5 g/dL    RDW 11.6 11.5 - 14.5 %    PLATELET 628 002 - 815 K/uL    MPV 9.4 8.9 - 12.9 FL    NRBC 0.0 0  WBC    ABSOLUTE NRBC 0.00 0.00 - 0.01 K/uL    NEUTROPHILS 60 32 - 75 %    LYMPHOCYTES 26 12 - 49 %    MONOCYTES 9 5 - 13 %    EOSINOPHILS 4 0 - 7 %    BASOPHILS 1 0 - 1 %    IMMATURE GRANULOCYTES 0 0.0 - 0.5 %    ABS. NEUTROPHILS 3.9 1.8 - 8.0 K/UL    ABS. LYMPHOCYTES 1.7 0.8 - 3.5 K/UL    ABS. MONOCYTES 0.6 0.0 - 1.0 K/UL    ABS. EOSINOPHILS 0.3 0.0 - 0.4 K/UL    ABS. BASOPHILS 0.1 0.0 - 0.1 K/UL    ABS. IMM.  GRANS. 0.0 0.00 - 0.04 K/UL    DF AUTOMATED     METABOLIC PANEL, BASIC    Collection Time: 12/23/21 12:51 PM   Result Value Ref Range    Sodium 138 136 - 145 mmol/L    Potassium 4.8 3.5 - 5.1 mmol/L    Chloride 100 97 - 108 mmol/L    CO2 27 21 - 32 mmol/L    Anion gap 11 5 - 15 mmol/L    Glucose 101 (H) 65 - 100 mg/dL    BUN 13 6 - 20 MG/DL    Creatinine 0.89 0.70 - 1.30 MG/DL    BUN/Creatinine ratio 15 12 - 20      GFR est AA >60 >60 ml/min/1.73m2    GFR est non-AA >60 >60 ml/min/1.73m2    Calcium 9.4 8.5 - 10.1 MG/DL   MAGNESIUM    Collection Time: 12/23/21 12:51 PM   Result Value Ref Range    Magnesium 2.2 1.6 - 2.4 mg/dL   TROPONIN-HIGH SENSITIVITY    Collection Time: 12/23/21 12:51 PM   Result Value Ref Range    Troponin-High Sensitivity 5 0 - 76 ng/L   SAMPLES BEING HELD    Collection Time: 12/23/21 12:51 PM   Result Value Ref Range    SAMPLES BEING HELD 1SST,1BLUE     COMMENT        Add-on orders for these samples will be processed based on acceptable specimen integrity and analyte stability, which may vary by analyte. Radiologic Studies -   XR CHEST SNGL V   Final Result   No evidence of active lung disease. CT Results  (Last 48 hours)    None        CXR Results  (Last 48 hours)               12/23/21 1238  XR CHEST SNGL V Final result    Impression:  No evidence of active lung disease. Narrative:  Chest single view dated 12/23/2021. Comparison chest dated 9/24/2018. HISTORY: Pain       The CT examination of the chest of 9/27/2018 is available for correlation. A single frontal view of the chest was obtained. The heart and lungs are normal   in appearance. There is evidence of thoracic spondylosis. Medical Decision Making   I am the first provider for this patient. I reviewed the vital signs, available nursing notes, past medical history, past surgical history, family history and social history. Vital Signs-Reviewed the patient's vital signs. Patient Vitals for the past 12 hrs:   Temp Pulse Resp BP SpO2   12/23/21 1215 97.9 °F (36.6 °C) 69 18 (!) 154/63 98 %       Pulse Oximetry Analysis - 98% on RA    Cardiac Monitor:   Rate: 69 bpm  Rhythm: Normal Sinus Rhythm        ED EKG interpretation: 12:18 PM  Rhythm: normal sinus rhythm; and regular . Rate (approx.): 63; Axis: normal; VA Interval: normal; QRS interval: normal ; ST/T wave: normal; This EKG was interpreted by Erum Caicedo MD,ED Provider.       Records Reviewed: Nursing Notes and Old Medical Records    Provider Notes (Medical Decision Making):   DDx: Dysrhythmia, A. fib, metabolic abnormality. ED Course:   Initial assessment performed. The patients presenting problems have been discussed, and they are in agreement with the care plan formulated and outlined with them. I have encouraged them to ask questions as they arise throughout their visit. PROGRESS NOTE    Pt reevaluated. EKG here. No evidence of A. fib. Negative troponin. CBC and CMP grossly unremarkable. Reassured patient. Discharge home. Advised him to follow-up with his cardiologist next week. Written by Cristofer Quevedo MD     Progress note:    Pt noted to be feeling better , ready for discharge. Updated pt and/or family on all final lab and imaging findings. Will follow up as instructed. All questions have been answered, pt voiced understanding and agreement with plan. Specific return precautions provided as well as instructions to return to the ED should sx worsen at any time. Vital signs stable for discharge. I have also put together some discharge instructions for them that include: 1) educational information regarding their diagnosis, 2) how to care for their diagnosis at home, as well a 3) list of reasons why they would want to return to the ED prior to their follow-up appointment, should their condition change. Written by Cristofer Quevedo MD        Critical Care Time:   0    Disposition:  Discharge    PLAN:  1. Current Discharge Medication List        2.    Follow-up Information     Follow up With Specialties Details Why Kerri Dye MD Internal Medicine Schedule an appointment as soon as possible for a visit in 1 week  [address] [facility] [address] [phone]      [address] [place] [address] Emergency Medicine Go in 1 day If symptoms worsen [address]    Rae Garcia MD Cardiology, Cardio Vascular Surgery, Clinical Cardiac Electrophysiology Schedule an appointment as soon as possible for a visit in 1 week  [address] [place] [address] [facility]  [phone]          Return to ED if worse     Diagnosis     Clinical Impression:   1. Palpitations              Please note that this dictation was completed with [employer], the computer voice recognition software. Quite often unanticipated grammatical, syntax, homophones, and other interpretive errors are inadvertently transcribed by the computer software. Please disregard these errors. Please excuse any errors that have escaped final proofreading.

## 2021-12-23 NOTE — TELEPHONE ENCOUNTER
Called patient back and per NP advised him to call Dr. Jennifer Olmos and see if they can fit him today, if he cannot and still having these symptoms, advised him to go the ER to get himself checked out. Patient says he will call Dr. Jennifer Olmos office now.

## 2021-12-23 NOTE — ED TRIAGE NOTES
Pt arrived by POV with complaint of A-fib. Pt reports he has a history of A-fib and has been feeling bad for 2 days. Pt reports he called his cardiologist in [place] who did his oblation and pt was advised to call Dr. Mery Cooper for which he did and Dr. Mery Cooper told pt to go to the ER. Pt reports he feels like his A-fib is gone but wants to be checked out.   Pt is awake alert and oriented x 4, pt educated on Er flow

## 2022-01-18 ENCOUNTER — ANESTHESIA EVENT (OUTPATIENT)
Dept: SURGERY | Age: 77
End: 2022-01-18
Payer: MEDICARE

## 2022-01-18 NOTE — ANESTHESIA PREPROCEDURE EVALUATION
Relevant Problems   CARDIOVASCULAR   (+) A-fib (HCC)   (+) Paroxysmal atrial fibrillation (HCC)   (+) Sinus bradycardia      ENDOCRINE   (+) Hyperthyroidism       Anesthetic History   No history of anesthetic complications            Review of Systems / Medical History  Patient summary reviewed, nursing notes reviewed and pertinent labs reviewed    Pulmonary  Within defined limits                 Neuro/Psych   Within defined limits           Cardiovascular            Dysrhythmias : atrial fibrillation        Comments: Carotid dz  Dilated aortic root   GI/Hepatic/Renal  Within defined limits              Endo/Other      Hypothyroidismhyperthyroidism       Other Findings            Physical Exam    Airway  Mallampati: I  TM Distance: > 6 cm  Neck ROM: normal range of motion   Mouth opening: Normal     Cardiovascular    Rhythm: regular  Rate: normal         Dental  No notable dental hx       Pulmonary  Breath sounds clear to auscultation               Abdominal  GI exam deferred       Other Findings            Anesthetic Plan    ASA: 3  Anesthesia type: general          Induction: Intravenous  Anesthetic plan and risks discussed with: Patient

## 2022-01-19 ENCOUNTER — HOSPITAL ENCOUNTER (OUTPATIENT)
Dept: PREADMISSION TESTING | Age: 77
Discharge: HOME | End: 2022-01-19

## 2022-01-20 ENCOUNTER — HOSPITAL ENCOUNTER (OUTPATIENT)
Dept: PREADMISSION TESTING | Age: 77
Discharge: HOME | End: 2022-01-20
Attending: ORTHOPAEDIC SURGERY
Payer: MEDICARE

## 2022-01-20 PROCEDURE — U0005 INFEC AGEN DETEC AMPLI PROBE: HCPCS

## 2022-01-21 LAB
SARS-COV-2, XPLCVT: NOT DETECTED
SOURCE, COVRS: NORMAL

## 2022-01-23 PROBLEM — S83.241A ACUTE MEDIAL MENISCUS TEAR OF RIGHT KNEE: Status: ACTIVE | Noted: 2022-01-23

## 2022-01-23 NOTE — H&P
History and Physical    Patient: Shemar Root MRN: [id number]  SSN: [id number]    YOB: 1945  Age: 78 y.o. Sex: male      Subjective: Shemar Root is a 78 y.o. male who presents with pain and catching in right knee. MRI showing medial meniscus tear. .     Past Medical History:   Diagnosis Date    Cancer ([facility] [address]     skin    Carotid artery disease ([facility] [address]     Carotid stenosis, bilateral     Hollenhorst plaque, left eye 2012    Hyperthyroidism     Paroxysmal atrial fibrillation (HCC)     Syncope      Past Surgical History:   Procedure Laterality Date    ECHO 2D ADULT  2011    EF 60%, Trace AI, PA 41    HX MALIGNANT SKIN LESION EXCISION  2021    bilaterally arms    [place] CARDIAC SURG PROCEDURE UNLIST      ablation for  fib      Family History   Problem Relation Age of Onset    No Known Problems Mother     No Known Problems Father      Social History     Tobacco Use    Smoking status: Former Smoker     Types: Cigarettes     Quit date: 1970     Years since quittin.6    Smokeless tobacco: Never Used   Substance Use Topics    Alcohol use: Yes     Alcohol/week: 1.0 standard drink     Types: 1 Glasses of wine per week     Comment: every day- 1 cocktail and glass of wine with dinner      Prior to Admission medications    Medication Sig Start Date End Date Taking? Authorizing Provider   cholecalciferol (Vitamin D3) (1000 Units /25 mcg) tablet Take 1,000 Units by mouth daily. Yes Provider, Historical   methIMAzole (TAPAZOLE) 5 mg tablet Take 2.5 mg by mouth every Monday and Thursday. 19  Yes Provider, Historical   OTHER Calcium,magnesium ,zinc & vitamin d3 3 tablets daily   Yes Provider, Historical   cyanocobalamin (VITAMIN B-12) 1,000 mcg tablet Take 1,000 mcg by mouth daily. Indications: nightly   Yes Provider, Historical   aspirin delayed-release 81 mg tablet Take  by mouth daily.    Yes Provider, Historical   fluticasone (FLONASE) 50 mcg/Actuation nasal spray by Nasal route as needed. Yes Provider, Historical        No Known Allergies    Review of Systems:  A comprehensive review of systems was negative. Objective: There were no vitals filed for this visit. Physical Exam:  GENERAL: alert, cooperative, no distress, appears stated age  LUNG: clear to auscultation bilaterally  HEART: regular rate and rhythm, S1, S2 normal, no murmur, click, rub or gallop  ABDOMEN: soft, non-tender. Bowel sounds normal. No masses,  no organomegaly  EXTREMITIES:  extremities normal, atraumatic, no cyanosis or edema  SKIN: Normal.  NEUROLOGIC: negative    Assessment:     Hospital Problems  Date Reviewed: 1/23/2022          Codes Class Noted POA    * (Principal) Acute medial meniscus tear of right knee ICD-10-CM: X37.914O  ICD-9-CM: 836.0  1/23/2022 Yes              Plan:     Arthroscopy right knee and medial meniscectomy.     Signed By: Montez Velez MD     January 23, 2022

## 2022-01-25 ENCOUNTER — ANESTHESIA (OUTPATIENT)
Dept: SURGERY | Age: 77
End: 2022-01-25
Payer: MEDICARE

## 2022-01-25 ENCOUNTER — HOSPITAL ENCOUNTER (OUTPATIENT)
Age: 77
Setting detail: OUTPATIENT SURGERY
Discharge: HOME | End: 2022-01-25
Attending: ORTHOPAEDIC SURGERY | Admitting: ORTHOPAEDIC SURGERY
Payer: MEDICARE

## 2022-01-25 VITALS
TEMPERATURE: 98 F | DIASTOLIC BLOOD PRESSURE: 65 MMHG | HEART RATE: 60 BPM | SYSTOLIC BLOOD PRESSURE: 133 MMHG | HEIGHT: 72 IN | WEIGHT: 165 LBS | OXYGEN SATURATION: 96 % | RESPIRATION RATE: 10 BRPM | BODY MASS INDEX: 22.35 KG/M2

## 2022-01-25 DIAGNOSIS — S83.241A ACUTE MEDIAL MENISCUS TEAR OF RIGHT KNEE, INITIAL ENCOUNTER: Primary | ICD-10-CM

## 2022-01-25 PROCEDURE — 76210000063 HC OR PH I REC FIRST 0.5 HR: Performed by: ORTHOPAEDIC SURGERY

## 2022-01-25 PROCEDURE — 76010000138 HC OR TIME 0.5 TO 1 HR: Performed by: ORTHOPAEDIC SURGERY

## 2022-01-25 PROCEDURE — 77030003666 HC NDL SPINAL BD -A: Performed by: ORTHOPAEDIC SURGERY

## 2022-01-25 PROCEDURE — 88305 TISSUE EXAM BY PATHOLOGIST: CPT

## 2022-01-25 PROCEDURE — 77030008574 HC TBNG SUC IRR STRY -B: Performed by: ORTHOPAEDIC SURGERY

## 2022-01-25 PROCEDURE — 77030028906 HC WRP KNEE W/GEL BGS SOLM -B: Performed by: ORTHOPAEDIC SURGERY

## 2022-01-25 PROCEDURE — 74011000250 HC RX REV CODE- 250: Performed by: ORTHOPAEDIC SURGERY

## 2022-01-25 PROCEDURE — 77030002916 HC SUT ETHLN J&J -A: Performed by: ORTHOPAEDIC SURGERY

## 2022-01-25 PROCEDURE — 76060000032 HC ANESTHESIA 0.5 TO 1 HR: Performed by: ORTHOPAEDIC SURGERY

## 2022-01-25 PROCEDURE — 2709999900 HC NON-CHARGEABLE SUPPLY: Performed by: ORTHOPAEDIC SURGERY

## 2022-01-25 PROCEDURE — 74011250636 HC RX REV CODE- 250/636: Performed by: ORTHOPAEDIC SURGERY

## 2022-01-25 PROCEDURE — 77030022036 HC BLD SHV TOMCAT STRY -B: Performed by: ORTHOPAEDIC SURGERY

## 2022-01-25 PROCEDURE — 77030018834: Performed by: ORTHOPAEDIC SURGERY

## 2022-01-25 PROCEDURE — 74011250636 HC RX REV CODE- 250/636: Performed by: ANESTHESIOLOGY

## 2022-01-25 PROCEDURE — 88304 TISSUE EXAM BY PATHOLOGIST: CPT

## 2022-01-25 PROCEDURE — 77030000032 HC CUF TRNQT ZIMM -B: Performed by: ORTHOPAEDIC SURGERY

## 2022-01-25 PROCEDURE — 77030040922 HC BLNKT HYPOTHRM STRY -A: Performed by: ORTHOPAEDIC SURGERY

## 2022-01-25 RX ORDER — HYDROCODONE BITARTRATE AND ACETAMINOPHEN 5; 325 MG/1; MG/1
1 TABLET ORAL
Qty: 10 TABLET | Refills: 0 | Status: SHIPPED | OUTPATIENT
Start: 2022-01-25 | End: 2022-01-28

## 2022-01-25 RX ORDER — SODIUM CHLORIDE, SODIUM LACTATE, POTASSIUM CHLORIDE, CALCIUM CHLORIDE 600; 310; 30; 20 MG/100ML; MG/100ML; MG/100ML; MG/100ML
150 INJECTION, SOLUTION INTRAVENOUS CONTINUOUS
Status: DISCONTINUED | OUTPATIENT
Start: 2022-01-25 | End: 2022-01-25 | Stop reason: HOSPADM

## 2022-01-25 RX ORDER — KETOROLAC TROMETHAMINE 30 MG/ML
INJECTION, SOLUTION INTRAMUSCULAR; INTRAVENOUS AS NEEDED
Status: DISCONTINUED | OUTPATIENT
Start: 2022-01-25 | End: 2022-01-25 | Stop reason: HOSPADM

## 2022-01-25 RX ORDER — IBUPROFEN 800 MG/1
800 TABLET ORAL
Qty: 40 TABLET | Refills: 1 | Status: SHIPPED | OUTPATIENT
Start: 2022-01-25

## 2022-01-25 RX ORDER — FLUOROURACIL 50 MG/G
CREAM TOPICAL
COMMUNITY
Start: 2021-03-04

## 2022-01-25 RX ORDER — ONDANSETRON 2 MG/ML
INJECTION INTRAMUSCULAR; INTRAVENOUS AS NEEDED
Status: DISCONTINUED | OUTPATIENT
Start: 2022-01-25 | End: 2022-01-25 | Stop reason: HOSPADM

## 2022-01-25 RX ORDER — BUPIVACAINE HYDROCHLORIDE 5 MG/ML
20 INJECTION, SOLUTION EPIDURAL; INTRACAUDAL ONCE
Status: COMPLETED | OUTPATIENT
Start: 2022-01-25 | End: 2022-01-25

## 2022-01-25 RX ORDER — DEXAMETHASONE SODIUM PHOSPHATE 4 MG/ML
INJECTION, SOLUTION INTRA-ARTICULAR; INTRALESIONAL; INTRAMUSCULAR; INTRAVENOUS; SOFT TISSUE AS NEEDED
Status: DISCONTINUED | OUTPATIENT
Start: 2022-01-25 | End: 2022-01-25 | Stop reason: HOSPADM

## 2022-01-25 RX ORDER — PROPOFOL 10 MG/ML
INJECTION, EMULSION INTRAVENOUS AS NEEDED
Status: DISCONTINUED | OUTPATIENT
Start: 2022-01-25 | End: 2022-01-25 | Stop reason: HOSPADM

## 2022-01-25 RX ADMIN — SODIUM CHLORIDE, POTASSIUM CHLORIDE, SODIUM LACTATE AND CALCIUM CHLORIDE 150 ML/HR: 600; 310; 30; 20 INJECTION, SOLUTION INTRAVENOUS at 10:19

## 2022-01-25 RX ADMIN — KETOROLAC TROMETHAMINE 30 MG: 30 INJECTION, SOLUTION INTRAMUSCULAR at 11:31

## 2022-01-25 RX ADMIN — ONDANSETRON 8 MG: 2 INJECTION INTRAMUSCULAR; INTRAVENOUS at 11:31

## 2022-01-25 RX ADMIN — PROPOFOL 200 MG: 10 INJECTION, EMULSION INTRAVENOUS at 10:47

## 2022-01-25 RX ADMIN — DEXAMETHASONE SODIUM PHOSPHATE 8 MG: 4 INJECTION, SOLUTION INTRAMUSCULAR; INTRAVENOUS at 11:31

## 2022-01-25 NOTE — ANESTHESIA POSTPROCEDURE EVALUATION
Procedure(s):  RIGHT KNEE ARTHROSCOPY. general    Anesthesia Post Evaluation      Multimodal analgesia: multimodal analgesia not used between 6 hours prior to anesthesia start to PACU discharge  Patient location during evaluation: bedside  Patient participation: complete - patient participated  Level of consciousness: awake and alert  Pain score: 0  Pain management: adequate  Airway patency: patent  Anesthetic complications: no  Cardiovascular status: acceptable  Respiratory status: acceptable  Hydration status: acceptable  Post anesthesia nausea and vomiting:  none  Final Post Anesthesia Temperature Assessment:  Normothermia (36.0-37.5 degrees C)      INITIAL Post-op Vital signs:   Vitals Value Taken Time   /65 01/25/22 1205   Temp 36.7 °C (98 °F) 01/25/22 1140   Pulse 60 01/25/22 1209   Resp 14 01/25/22 1209   SpO2 96 % 01/25/22 1209   Vitals shown include unvalidated device data.

## 2022-01-25 NOTE — BRIEF OP NOTE
Brief Postoperative Note    Patient: Carli Luevano  YOB: 1945  MRN: [id number]    Date of Procedure: 1/25/2022     Pre-Op Diagnosis: ACUTE TEAR OF MEDIAL MENISCUS RIGHT KNEE    Post-Op Diagnosis: Same as preoperative diagnosis. with anterior horn lateral meniscus tear     Procedure(s):  RIGHT KNEE ARTHROSCOPY WIT PARTIAL MEDIAL AND LATERAL MENISCECTOMY.     Surgeon(s):  Adria Garrison MD    Surgical Assistant: None    Anesthesia: General     Estimated Blood Loss (mL): Minimal    Complications: None    Specimens:   ID Type Source Tests Collected by Time Destination   1 : Right Knee Chondratome Shavings from Arthroscopy Preservative Knee, right  Adria Garrison MD 1/25/2022 1115 Pathology        Implants: * No implants in log *    Drains: * No LDAs found *    Findings: Complex posterior horn tear with ant horn lat meniscus    Electronically Signed by Janel Asencio MD on 1/25/2022 at 11:49 AM

## 2022-01-25 NOTE — OP NOTES
[facility]  OPERATIVE REPORT    Name:  Liana Steele  MR#:  [id number]  :  1945  ACCOUNT #:  [de-identified]  DATE OF SERVICE:  2022    PREOPERATIVE DIAGNOSIS:  Medial meniscus tear, right knee. POSTOPERATIVE DIAGNOSES:  Complex posterior horn medial meniscus tear, right knee with anterior horn lateral meniscus tear, right knee. PROCEDURE PERFORMED:  Arthroscopy with partial medial and lateral meniscectomy. SURGEON:  MD VAL MitchellKaleida Healthenedelia Lyles. ANESTHESIA:  General LMA, Barbara Adam MD.    COMPLICATIONS:  None. TOURNIQUET TIME:  33 minutes. SPECIMENS REMOVED:  Knee shavings. IMPLANTS:  None. ESTIMATED BLOOD LOSS:  Negligible under tourniquet control. FINDINGS:  Complex tear of the posterior horn with extruded flap tear and tear of the anterior horn lateral meniscus. There was grade III chondrosis over the medial femoral condyle as well. PROCEDURE:  The patient was evaluated in the outpatient surgery area. Right knee marked as surgical site, verified with the patient. He was taken to the operating room, placed supine on the operating table, placed under general LMA anesthesia. The right leg prepped and draped in the usual sterile fashion. It was exsanguinated and the tourniquet inflated to 300 mmHg. He had been placed in the leg holding device. The anterolateral portal was established. Anteromedial working portal was established. Visualization through the lateral compartment and the patellofemoral joint revealed no loose bodies. There was mild grade I to II articular cartilage changes on the undersurface of the patella and trochlear groove, but overall, this was intact and did not require any specific treatment. Moving into the medial gutter, this was clear. Into the medial compartment, there was immediately noted irregularity of the posterior horn medial meniscus.   In addition, there was grade III chondrosis over the medial femoral condyle. Motorized resector was introduced and loose cartilage flaps off the medial femoral condyle were removed as necessary. The posterior horn medial meniscus was initially debrided. It was additionally debrided with duckbill forceps and probe was used to bring the extruded flap into the joint where it could be debrided. The majority of the posterior horn was debrided from the knee. This gave good stability of the posterior horn medial meniscus with probing. The anterior horn was intact. The tibial articular cartilage was overall intact. Moving into the intercondylar notch, the ACL appeared to be intact throughout and stable to intraoperative Lachman testing. The lateral meniscus was noted to be intact for the most part. There was a focal tear over the anterior horn of lateral meniscus. This was debrided with motorized resector back to stable tissue. The lateral gutter was clear. Popliteus hiatus was clear of loose bodies. Moving back into the patellofemoral joint, irrigation was carried out to remove loose fragments of cartilage. At this point, the joint was irrigated. The scope and instruments were removed. The subcutaneous tissue around the joint was infiltrated with 20 mL of 0.5% Marcaine. Incisions closed with 3-0 nylon. Sterile bulky compressive dressing was applied. Tourniquet released for tourniquet time 33 minutes. The patient returned to recovery room in satisfactory condition.       Ernie Wright MD      [id number]  D:  01/25/2022 11:57  T:  01/25/2022 12:29  JOB #:  [id number]  CC:  Sanjuana Delgado MD

## 2022-01-25 NOTE — DISCHARGE INSTRUCTIONS
Beena Hill MD      After Hospital Care Plan:  Discharge Instructions Knee arthroscopy-  Patient Name:Elliot Belle  Date of procedure:1/25/2022    Procedure:Procedure(s):  RIGHT KNEE ARTHROSCOPY  Surgeon:Surgeon(s) and Role:     * Joaquín Prieto MD - Primary   PCP: Janna Rodríguez MD  Date of discharge: No discharge date for patient encounter. Follow up appointments  -follow up with Dr. Manohar Epps in 1 week. Call [phone]  to make an appointment. Use ice every 2-3 hours to decrease swelling. Take aspirin 81 mg once daily to help prevent blood clots. Take Ibuprofen 800 mg. 4 times a day as needed to decrease pain and inflammation. Take oxycodone / hydrocodone 5 mg only as needed every 4-6 hours for pain. These medications have Tylenol in them and you should avoid taking additional Tylenol within 4 hr of taking these medications. You may take Tylenol  in recommended dosages to supplement the ibuprofen or any other anti inflammatory medications for additional pain relief. Use crutches for balance however you may bear weight as tolerated in your operative leg. There are no restrictions as far as how much weight you may bear on your operative leg. You may remove dressing and shower 48 hours from surgery. You may shower at that time but do not soak the incisions under water. You may leave the incisions open or cover with Band-Aids to avoid contact with clothing. Try to keep active and stay moving throughout the day and advance your activity as you can tolerate on the operative knee. This helps avoid postoperative stiffness and decreases the chance of developing a blood clot in the leg.

## 2022-01-29 ENCOUNTER — HOSPITAL ENCOUNTER (EMERGENCY)
Age: 77
Discharge: HOME | End: 2022-01-29
Attending: FAMILY MEDICINE | Admitting: FAMILY MEDICINE
Payer: MEDICARE

## 2022-01-29 VITALS
RESPIRATION RATE: 20 BRPM | WEIGHT: 164 LBS | HEART RATE: 87 BPM | SYSTOLIC BLOOD PRESSURE: 164 MMHG | TEMPERATURE: 97.9 F | BODY MASS INDEX: 22.24 KG/M2 | DIASTOLIC BLOOD PRESSURE: 70 MMHG | OXYGEN SATURATION: 97 %

## 2022-01-29 DIAGNOSIS — K59.03 DRUG-INDUCED CONSTIPATION: Primary | ICD-10-CM

## 2022-01-29 PROCEDURE — 99284 EMERGENCY DEPT VISIT MOD MDM: CPT

## 2022-01-29 PROCEDURE — 74011250637 HC RX REV CODE- 250/637: Performed by: FAMILY MEDICINE

## 2022-01-29 RX ORDER — MAGNESIUM CITRATE
296 SOLUTION, ORAL ORAL
Status: COMPLETED | OUTPATIENT
Start: 2022-01-29 | End: 2022-01-29

## 2022-01-29 NOTE — ED TRIAGE NOTES
Patient arrived via EMS, report states patient was attempting use the restroom and vagel down and started to feel dizzy. Patient reports constipation.

## 2022-01-29 NOTE — DISCHARGE INSTRUCTIONS
--Magnesium citrate 300 ml in the morning when you wake up. --Once you have several large BM's, start Miralax 17 grams daily for the next 3-4 days.

## 2022-01-29 NOTE — ED NOTES
Soap suds enema given while patient in the bathroom. instructed to hold liquid as long as possible.  Verbalized understanding

## 2022-01-29 NOTE — ED PROVIDER NOTES
EMERGENCY DEPARTMENT HISTORY AND PHYSICAL EXAM          Date: 1/29/2022  Patient Name: Damion Mcdermott    History of Presenting Illness     Chief Complaint   Patient presents with    Constipation       History Provided By: Patient    HPI: Damion Mcdermott is a 78 y.o. male, post op day 3 from knee surgery, who was brought to the ED because of an episode of becoming pale, diaphoretic and light headed from straining. His wife became alarmed and called EMS. Patient denies losing consciousness or falling. He has been able to eat and drink normally, but since he was taking hydrocodone, he has become constipated. Last BM was 3 days ago. About 12 hours ago he took several Sennokot pills, and then a few hours later took another 4 pills, without relief. He has had no fevers, abdominal pain, or vomiting. PCP: Blaine Brown MD    Allergies: NKDA  Social Hx: No tobacco, vaping, 1 drink per week EtOH; Lives locally    There are no other complaints, changes, or physical findings at this time. Current Outpatient Medications   Medication Sig Dispense Refill    fluorouraciL (EFUDEX) 5 % chemo cream       ibuprofen (MOTRIN) 800 mg tablet Take 1 Tablet by mouth every six (6) hours as needed for Pain. 40 Tablet 1    cholecalciferol (Vitamin D3) (1000 Units /25 mcg) tablet Take 1,000 Units by mouth daily.  methIMAzole (TAPAZOLE) 5 mg tablet Take 2.5 mg by mouth every Monday and Thursday.  OTHER Calcium,magnesium ,zinc & vitamin d3 3 tablets daily      cyanocobalamin (VITAMIN B-12) 1,000 mcg tablet Take 1,000 mcg by mouth daily. Indications: nightly      aspirin delayed-release 81 mg tablet Take  by mouth daily.  fluticasone (FLONASE) 50 mcg/Actuation nasal spray by Nasal route as needed.          Past History     Past Medical History:  Past Medical History:   Diagnosis Date    Cancer ([facility] [address]     skin    Carotid artery disease ([facility] [address]     Carotid stenosis, bilateral     Hollenhorst plaque, left eye 2012    Hyperthyroidism     Paroxysmal atrial fibrillation (HCC)     Syncope        Past Surgical History:  Past Surgical History:   Procedure Laterality Date    ECHO 2D ADULT  2011    EF 60%, Trace YULIANA, PA 41    HX MALIGNANT SKIN LESION EXCISION  2021    bilaterally arms    [place] CARDIAC SURG PROCEDURE UNLIST      ablation for  fib       Family History:  Family History   Problem Relation Age of Onset    No Known Problems Mother     No Known Problems Father        Social History:  Social History     Tobacco Use    Smoking status: Former Smoker     Types: Cigarettes     Quit date: 1970     Years since quittin.6    Smokeless tobacco: Never Used   Vaping Use    Vaping Use: Never used   Substance Use Topics    Alcohol use: Yes     Alcohol/week: 1.0 standard drink     Types: 1 Glasses of wine per week     Comment: every day- 1 cocktail and glass of wine with dinner    Drug use: Never       Allergies:  No Known Allergies      Review of Systems   Review of Systems   Constitutional: Positive for diaphoresis. Negative for chills and fever. HENT: Negative for congestion, postnasal drip, rhinorrhea and sore throat. Respiratory: Negative for cough and shortness of breath. Neurological: Positive for light-headedness. Physical Exam   Physical Exam  Vitals reviewed. Constitutional:       General: He is not in acute distress. Appearance: He is well-developed. He is not diaphoretic. HENT:      Head: Normocephalic and atraumatic. Right Ear: External ear normal.      Left Ear: External ear normal.      Mouth/Throat:      Mouth: Mucous membranes are moist.      Pharynx: No oropharyngeal exudate. Eyes:      General: No scleral icterus. Right eye: No discharge. Left eye: No discharge. Conjunctiva/sclera: Conjunctivae normal.      Pupils: Pupils are equal, round, and reactive to light. Neck:      Thyroid: No thyromegaly. Vascular: No JVD.       Trachea: No tracheal deviation. Cardiovascular:      Rate and Rhythm: Normal rate. Heart sounds: Normal heart sounds. No murmur heard. No friction rub. No gallop. Pulmonary:      Effort: Pulmonary effort is normal. No respiratory distress. Breath sounds: No wheezing or rales. Abdominal:      General: Bowel sounds are normal. There is no distension. Palpations: Abdomen is soft. Tenderness: There is no abdominal tenderness. There is no rebound. Genitourinary:     Comments: Rectal exam: Large amount of stool in vault, broken up digitally. Two small boluses able to removed. Musculoskeletal:         General: No tenderness or deformity. Normal range of motion. Cervical back: Normal range of motion and neck supple. Skin:     General: Skin is warm and dry. Neurological:      Mental Status: He is alert and oriented to person, place, and time. Cranial Nerves: No cranial nerve deficit. Coordination: Coordination normal.      Deep Tendon Reflexes: Reflexes are normal and symmetric. Diagnostic Study Results     Radiologic Studies -   No orders to display     CT Results  (Last 48 hours)    None        CXR Results  (Last 48 hours)    None            Medical Decision Making   I am the first provider for this patient. I reviewed the vital signs, available nursing notes, past medical history, past surgical history, family history and social history. Vital Signs-Reviewed the patient's vital signs. Patient Vitals for the past 12 hrs:   Temp Pulse Resp BP SpO2   01/29/22 0226 97.9 °F (36.6 °C) 87 20 (!) 164/70 97 %       Pulse Oximetry Analysis - 97% on RA     Records Reviewed: Nursing Notes, Old Medical Records, Previous electrocardiograms, Previous Radiology Studies and Previous Laboratory Studies    Provider Notes (Medical Decision Making):   MDM:  Pt awake and alert on arrival. Biggest concern is constipation, as the diaphoresis and dizziness have resolved.  Will begin with digital disimpaction, and give enema if needed. ED Course:   Initial assessment performed. The patients presenting problems have been discussed, and they are in agreement with the care plan formulated and outlined with them. I have encouraged them to ask questions as they arise throughout their visit. PROGRESS NOTE:  After initial digital disimpaction, the patient was unable to pass the stool, so he was given a soap suds enema. Again he had little results on the toilet, so more stool was removed with digital approach. At that point it seemed that the patient needed \"a break,\" so he was given a bottle of magnesium citrate and discharged home. When he awakes from a short sleep later this am, he should drink the magnesium citrate, and he was advised that he would have some strong contractions of the smooth muscles of his colon and rectum. Discharge note:  Pt re-evaluated and noted to be ready for discharge. Will follow up as instructed. All questions have been answered, pt voiced understanding and agreement with plan. Specific return precautions provided as well as instructions to return to the ED should sx worsen at any time. Vital signs stable for discharge. Critical Care Time:   0      Diagnosis     Clinical Impression:   1. Drug-induced constipation        PLAN:  1. Discharge Medication List as of 1/29/2022  3:28 AM        2.    Follow-up Information     Follow up With Specialties Details Why Contact Info    Milind Mcmanus MD Internal Medicine Call in 3 days As needed [address] [facility]  [phone]          Return to ED if worse     Disposition:  Home

## 2022-02-01 ENCOUNTER — OFFICE VISIT (OUTPATIENT)
Dept: CARDIOLOGY CLINIC | Age: 77
End: 2022-02-01
Payer: MEDICARE

## 2022-02-01 ENCOUNTER — CLINICAL SUPPORT (OUTPATIENT)
Dept: CARDIOLOGY CLINIC | Age: 77
End: 2022-02-01
Payer: MEDICARE

## 2022-02-01 VITALS
SYSTOLIC BLOOD PRESSURE: 110 MMHG | HEIGHT: 72 IN | OXYGEN SATURATION: 99 % | WEIGHT: 166.3 LBS | BODY MASS INDEX: 22.52 KG/M2 | HEART RATE: 73 BPM | DIASTOLIC BLOOD PRESSURE: 58 MMHG

## 2022-02-01 DIAGNOSIS — I48.0 PAROXYSMAL ATRIAL FIBRILLATION (HCC): ICD-10-CM

## 2022-02-01 DIAGNOSIS — R00.2 PALPITATIONS: Primary | ICD-10-CM

## 2022-02-01 DIAGNOSIS — E78.5 DYSLIPIDEMIA: ICD-10-CM

## 2022-02-01 DIAGNOSIS — Z98.890 S/P ABLATION OF ATRIAL FIBRILLATION: ICD-10-CM

## 2022-02-01 DIAGNOSIS — Z86.79 S/P ABLATION OF ATRIAL FIBRILLATION: ICD-10-CM

## 2022-02-01 DIAGNOSIS — R00.1 SINUS BRADYCARDIA: ICD-10-CM

## 2022-02-01 PROCEDURE — G8427 DOCREV CUR MEDS BY ELIG CLIN: HCPCS | Performed by: INTERNAL MEDICINE

## 2022-02-01 PROCEDURE — 93228 REMOTE 30 DAY ECG REV/REPORT: CPT | Performed by: INTERNAL MEDICINE

## 2022-02-01 PROCEDURE — G8510 SCR DEP NEG, NO PLAN REQD: HCPCS | Performed by: INTERNAL MEDICINE

## 2022-02-01 PROCEDURE — 3017F COLORECTAL CA SCREEN DOC REV: CPT | Performed by: INTERNAL MEDICINE

## 2022-02-01 PROCEDURE — 93005 ELECTROCARDIOGRAM TRACING: CPT | Performed by: INTERNAL MEDICINE

## 2022-02-01 PROCEDURE — 99214 OFFICE O/P EST MOD 30 MIN: CPT | Performed by: INTERNAL MEDICINE

## 2022-02-01 PROCEDURE — G8536 NO DOC ELDER MAL SCRN: HCPCS | Performed by: INTERNAL MEDICINE

## 2022-02-01 PROCEDURE — 1101F PT FALLS ASSESS-DOCD LE1/YR: CPT | Performed by: INTERNAL MEDICINE

## 2022-02-01 PROCEDURE — G8420 CALC BMI NORM PARAMETERS: HCPCS | Performed by: INTERNAL MEDICINE

## 2022-02-01 PROCEDURE — G0463 HOSPITAL OUTPT CLINIC VISIT: HCPCS | Performed by: INTERNAL MEDICINE

## 2022-02-01 PROCEDURE — 93010 ELECTROCARDIOGRAM REPORT: CPT | Performed by: INTERNAL MEDICINE

## 2022-02-01 NOTE — LETTER
2/1/2022    Patient: Vianney Burn   YOB: 1945   Date of Visit: 2/1/2022     Benson Hadley MD  [facility] [address] [id number]  Via Fax: [phone]    Dear Benson Hadley MD,      Thank you for referring Mr. Cori Garcia to [address] [place] [address] [facility] for evaluation. My notes for this consultation are attached. If you have questions, please do not hesitate to call me. I look forward to following your patient along with you.       Sincerely,    Cassandra Gamino MD

## 2022-02-01 NOTE — PROGRESS NOTES
Chief Complaint   Patient presents with    Irregular Heart Beat     ED visit on 12- for Palpitations-Denies cardiac symptoms     1. Have you been to the ER, urgent care clinic since your last visit? Hospitalized since your last visit? [facility] ED on DEC 23, 2021  2. Have you seen or consulted any other health care providers outside of the [address] [place] [address] since your last visit? Include any pap smears or colon screening.  Yes, Ramy Iverson

## 2022-02-01 NOTE — PROGRESS NOTES
Subjective: Reyes Savannah is a 78 y.o. male is here for follow up after going to ED 21 for c/o palpitations. He reports in general he has done well post ablation for afib in 2018. He had a bad episodes of vertigo symptoms prior to ED visit. Felt very lightheaded. Had to sit down and rest. Lasted about 2 hours. Developed sweating during the episode. He then started to feel palpitations after that event. Skipped beats were the sensation. Denies accompanying symptoms. It persisted so he went to ED where w/u was neg. He denies chest pain/ shortness of breath, orthopnea, PND, LE edema, or fatigue. He had issues with his knee and required surgery on his meniscus last week. Palpitations have stopped.       Patient Active Problem List    Diagnosis Date Noted    A-fib [facility]) 10/03/2018    Syncope 2018    Dilated aortic root ([address] 2016    Sinus bradycardia 2016    Carotid stenosis, bilateral     Paroxysmal atrial fibrillation ([address]     Hyperthyroidism       Tori Turner MD  Past Medical History:   Diagnosis Date    Cancer [facility])     skin    Carotid artery disease ([address]     Carotid stenosis, bilateral     Hollenhorst plaque, left eye 2012    Hyperthyroidism     Paroxysmal atrial fibrillation ([address]     Syncope       Past Surgical History:   Procedure Laterality Date    ECHO 2D ADULT  2011    EF 60%, Trace YULIANA, PA 41    HX MALIGNANT SKIN LESION EXCISION  2021    bilaterally arms    CT CARDIAC SURG PROCEDURE UNLIST      ablation for  fib     No Known Allergies   Family History   Problem Relation Age of Onset    No Known Problems Mother     No Known Problems Father     negative for cardiac disease  Social History     Socioeconomic History    Marital status:    Tobacco Use    Smoking status: Former Smoker     Types: Cigarettes     Quit date: 1970     Years since quittin.6    Smokeless tobacco: Never Used   Vaping Use    Vaping Use: Never used Substance and Sexual Activity    Alcohol use: Yes     Alcohol/week: 1.0 standard drink     Types: 1 Glasses of wine per week     Comment: every day- 1 cocktail and glass of wine with dinner    Drug use: Never     Current Outpatient Medications   Medication Sig    ibuprofen (MOTRIN) 800 mg tablet Take 1 Tablet by mouth every six (6) hours as needed for Pain.  cholecalciferol (Vitamin D3) (1000 Units /25 mcg) tablet Take 1,000 Units by mouth daily.  methIMAzole (TAPAZOLE) 5 mg tablet Take 2.5 mg by mouth every Monday and Thursday.  OTHER Calcium,magnesium ,zinc & vitamin d3 3 tablets daily    cyanocobalamin (VITAMIN B-12) 1,000 mcg tablet Take 1,000 mcg by mouth daily. Indications: nightly    aspirin delayed-release 81 mg tablet Take  by mouth daily.  fluticasone (FLONASE) 50 mcg/Actuation nasal spray by Nasal route as needed.  fluorouraciL (EFUDEX) 5 % chemo cream  (Patient not taking: Reported on 2/1/2022)     No current facility-administered medications for this visit. Vitals:    02/01/22 1314   BP: (!) 110/58   Pulse: 73   SpO2: 99%   Weight: 166 lb 4.8 oz (75.4 kg)   Height: 6' (1.829 m)       I have reviewed the nurses notes, vitals, problem list, allergy list, medical history, family, social history and medications. Review of Symptoms:    General: Pt denies excessive weight gain or loss. Pt is able to conduct ADL's  HEENT: Denies blurred vision, headaches, epistaxis and difficulty swallowing. Respiratory: Denies shortness of breath, DONOHUE, wheezing or stridor. Cardiovascular: Denies precordial pain, palpitations, edema or PND  Gastrointestinal: Denies poor appetite, indigestion, abdominal pain or blood in stool  Urinary: Denies dysuria, pyuria  Musculoskeletal: Denies pain or swelling from muscles or joints  Neurologic: Denies tremor, paresthesias, or sensory motor disturbance  Skin: Denies rash, itching or texture change.   Psych: Denies depression      Physical Exam:      General: Well developed, in no acute distress. HEENT: Eyes - PERRL, no jvd  Heart:  Normal S1/S2 negative S3 or S4. Regular, no murmur, gallop or rub. Respiratory: Clear bilaterally x 4, no wheezing or rales  Abdomen:   Soft, non-tender, bowel sounds are active. Extremities:  No edema, normal cap refill, no cyanosis. Musculoskeletal: No clubbing  Neuro: A&Ox3, speech clear, gait stable. Skin: Skin color is normal. No rashes or lesions. Non diaphoretic  Vascular: 2+ pulses symmetric in all extremities    Cardiographics    Ekg:SR HR 64    Echo 2/2021  · LV: Estimated LVEF is 55 - 60%. Visually measured ejection fraction. Normal cavity size, wall thickness and systolic function (ejection fraction normal). Mild (grade 1) left ventricular diastolic dysfunction. · MV: Mild mitral valve regurgitation is present. · TV: Mild tricuspid valve regurgitation is present. · Mild aortic valve sclerosis with no evidence of reduced excursion. · Aneurysm present in the aortic root (sinus level) and ascending aorta. There is no holodiastolic flow reversal in the descending aorta.  There is no plaque  · Ao Root D 4.57 cm        Results for orders placed or performed during the hospital encounter of 12/23/21   EKG, 12 LEAD, INITIAL   Result Value Ref Range    Ventricular Rate 63 BPM    Atrial Rate 63 BPM    P-R Interval 150 ms    QRS Duration 92 ms    Q-T Interval 384 ms    QTC Calculation (Bezet) 392 ms    Calculated P Axis 55 degrees    Calculated R Axis 31 degrees    Calculated T Axis 63 degrees    Diagnosis       Normal sinus rhythm  Normal ECG  When compared with ECG of 04-OCT-2018 03:52,  No significant change was found  Confirmed by Christofer Pearson MD, --- ([id number]) on 12/23/2021 9:31:59 PM     Results for orders placed or performed in visit on 05/12/14   AMB POC EKG ROUTINE W/ 12 LEADS, INTER & REP    Narrative    Karo [place]             Lab Results   Component Value Date/Time    WBC 6.6 12/23/2021 12:51 PM    HGB 12.9 12/23/2021 12:51 PM    HCT 37.4 12/23/2021 12:51 PM    PLATELET 356 56/41/4500 12:51 PM    MCV 94.9 12/23/2021 12:51 PM      Lab Results   Component Value Date/Time    Sodium 138 12/23/2021 12:51 PM    Potassium 4.8 12/23/2021 12:51 PM    Chloride 100 12/23/2021 12:51 PM    CO2 27 12/23/2021 12:51 PM    Anion gap 11 12/23/2021 12:51 PM    Glucose 101 (H) 12/23/2021 12:51 PM    BUN 13 12/23/2021 12:51 PM    Creatinine 0.89 12/23/2021 12:51 PM    BUN/Creatinine ratio 15 12/23/2021 12:51 PM    GFR est AA >60 12/23/2021 12:51 PM    GFR est non-AA >60 12/23/2021 12:51 PM    Calcium 9.4 12/23/2021 12:51 PM    Bilirubin, total 0.7 09/24/2018 08:47 AM    Alk. phosphatase 56 09/24/2018 08:47 AM    Protein, total 6.6 09/24/2018 08:47 AM    Albumin 4.2 09/24/2018 08:47 AM    Globulin 3.4 08/22/2018 07:33 AM    A-G Ratio 1.8 09/24/2018 08:47 AM    ALT (SGPT) 13 09/24/2018 08:47 AM         Assessment:            ICD-10-CM ICD-9-CM    1. Palpitations  R00.2 785.1    2. Paroxysmal atrial fibrillation (HCC)  I48.0 427.31 AMB POC EKG ROUTINE W/ 12 LEADS, INTER & REP      CARDIAC EVENT MONITOR   3. S/P ablation of atrial fibrillation  Z98.890 V45.89 CARDIAC EVENT MONITOR    Z86.79     4. Sinus bradycardia  R00.1 427.89 CARDIAC EVENT MONITOR   5. Dyslipidemia  E78.5 272.4 CARDIAC EVENT MONITOR     Orders Placed This Encounter    AMB POC EKG ROUTINE W/ 12 LEADS, INTER & REP     Order Specific Question:   Reason for Exam:     Answer:   routine        Plan:   Mr. Lucila Grant is S/P PVI of all 4 PVs, cardioversion to sinus rhythm 10/3/18. He had onset of a \"vertigo\" episode and then started to notice palpitations. Went to ED with w/u neg on 12/23/21. He has since then had the palpitations ease off. He gets his thyroid labs completed every 6 months and it has been stable. Obtain event monitor. If negative, then f/u in one year. chadsvasc of 0 - not on oac. Continue medical management for thyroid disease.     Thank you for allowing me to participate in Vaibhav Marilee 's care.       Antonia Liegh MD

## 2022-02-01 NOTE — PROGRESS NOTES
Patient received a 30 DAYevent monitor. Instructions given verbally as well as an instruction sheet. Pt verbalized understanding.     [facility] Event Monitoring

## 2022-03-19 PROBLEM — I48.91 A-FIB (HCC): Status: ACTIVE | Noted: 2018-10-03

## 2022-03-19 PROBLEM — R55 SYNCOPE: Status: ACTIVE | Noted: 2018-08-22

## 2022-06-10 ENCOUNTER — HOSPITAL ENCOUNTER (EMERGENCY)
Age: 77
Discharge: HOME | End: 2022-06-10
Attending: EMERGENCY MEDICINE
Payer: MEDICARE

## 2022-06-10 VITALS
OXYGEN SATURATION: 97 % | DIASTOLIC BLOOD PRESSURE: 68 MMHG | HEIGHT: 72 IN | WEIGHT: 164.9 LBS | TEMPERATURE: 98.1 F | BODY MASS INDEX: 22.34 KG/M2 | SYSTOLIC BLOOD PRESSURE: 135 MMHG | HEART RATE: 61 BPM | RESPIRATION RATE: 16 BRPM

## 2022-06-10 DIAGNOSIS — R42 VERTIGO: Primary | ICD-10-CM

## 2022-06-10 DIAGNOSIS — R42 DIZZY SPELLS: ICD-10-CM

## 2022-06-10 LAB
ALBUMIN SERPL-MCNC: 4 G/DL (ref 3.5–5)
ALBUMIN/GLOB SERPL: 1.3 {RATIO} (ref 1.1–2.2)
ALP SERPL-CCNC: 63 U/L (ref 45–117)
ALT SERPL-CCNC: 24 U/L (ref 12–78)
ANION GAP SERPL CALC-SCNC: 10 MMOL/L (ref 5–15)
AST SERPL-CCNC: 16 U/L (ref 15–37)
BASOPHILS # BLD: 0 K/UL (ref 0–0.1)
BASOPHILS NFR BLD: 1 % (ref 0–1)
BILIRUB SERPL-MCNC: 1.1 MG/DL (ref 0.2–1)
BUN SERPL-MCNC: 15 MG/DL (ref 6–20)
BUN/CREAT SERPL: 16 (ref 12–20)
CALCIUM SERPL-MCNC: 8.9 MG/DL (ref 8.5–10.1)
CHLORIDE SERPL-SCNC: 102 MMOL/L (ref 97–108)
CO2 SERPL-SCNC: 28 MMOL/L (ref 21–32)
CREAT SERPL-MCNC: 0.91 MG/DL (ref 0.7–1.3)
DIFFERENTIAL METHOD BLD: ABNORMAL
EOSINOPHIL # BLD: 0.3 K/UL (ref 0–0.4)
EOSINOPHIL NFR BLD: 4 % (ref 0–7)
ERYTHROCYTE [DISTWIDTH] IN BLOOD BY AUTOMATED COUNT: 12.2 % (ref 11.5–14.5)
GLOBULIN SER CALC-MCNC: 3.1 G/DL (ref 2–4)
GLUCOSE SERPL-MCNC: 108 MG/DL (ref 65–100)
HCT VFR BLD AUTO: 36.9 % (ref 36.6–50.3)
HGB BLD-MCNC: 12.5 G/DL (ref 12.1–17)
IMM GRANULOCYTES # BLD AUTO: 0 K/UL (ref 0–0.04)
IMM GRANULOCYTES NFR BLD AUTO: 0 % (ref 0–0.5)
LYMPHOCYTES # BLD: 2.8 K/UL (ref 0.8–3.5)
LYMPHOCYTES NFR BLD: 36 % (ref 12–49)
MCH RBC QN AUTO: 32.4 PG (ref 26–34)
MCHC RBC AUTO-ENTMCNC: 33.9 G/DL (ref 30–36.5)
MCV RBC AUTO: 95.6 FL (ref 80–99)
MONOCYTES # BLD: 0.7 K/UL (ref 0–1)
MONOCYTES NFR BLD: 9 % (ref 5–13)
NEUTS SEG # BLD: 3.9 K/UL (ref 1.8–8)
NEUTS SEG NFR BLD: 50 % (ref 32–75)
NRBC # BLD: 0 K/UL (ref 0–0.01)
NRBC BLD-RTO: 0 PER 100 WBC
PLATELET # BLD AUTO: 244 K/UL (ref 150–400)
PMV BLD AUTO: 10 FL (ref 8.9–12.9)
POTASSIUM SERPL-SCNC: 3.7 MMOL/L (ref 3.5–5.1)
PROT SERPL-MCNC: 7.1 G/DL (ref 6.4–8.2)
RBC # BLD AUTO: 3.86 M/UL (ref 4.1–5.7)
SODIUM SERPL-SCNC: 140 MMOL/L (ref 136–145)
TROPONIN-HIGH SENSITIVITY: 7 NG/L (ref 0–76)
WBC # BLD AUTO: 7.6 K/UL (ref 4.1–11.1)

## 2022-06-10 PROCEDURE — 74011250636 HC RX REV CODE- 250/636: Performed by: EMERGENCY MEDICINE

## 2022-06-10 PROCEDURE — 80053 COMPREHEN METABOLIC PANEL: CPT

## 2022-06-10 PROCEDURE — 93005 ELECTROCARDIOGRAM TRACING: CPT

## 2022-06-10 PROCEDURE — 84484 ASSAY OF TROPONIN QUANT: CPT

## 2022-06-10 PROCEDURE — 36415 COLL VENOUS BLD VENIPUNCTURE: CPT

## 2022-06-10 PROCEDURE — 96374 THER/PROPH/DIAG INJ IV PUSH: CPT

## 2022-06-10 PROCEDURE — 85025 COMPLETE CBC W/AUTO DIFF WBC: CPT

## 2022-06-10 PROCEDURE — 99284 EMERGENCY DEPT VISIT MOD MDM: CPT

## 2022-06-10 RX ORDER — ONDANSETRON 2 MG/ML
4 INJECTION INTRAMUSCULAR; INTRAVENOUS
Status: COMPLETED | OUTPATIENT
Start: 2022-06-10 | End: 2022-06-10

## 2022-06-10 RX ORDER — MECLIZINE HCL 12.5 MG 12.5 MG/1
50 TABLET ORAL
Status: COMPLETED | OUTPATIENT
Start: 2022-06-10 | End: 2022-06-10

## 2022-06-10 RX ORDER — ONDANSETRON 4 MG/1
4 TABLET, ORALLY DISINTEGRATING ORAL
Qty: 12 TABLET | Refills: 0 | Status: SHIPPED | OUTPATIENT
Start: 2022-06-10

## 2022-06-10 RX ORDER — MECLIZINE HYDROCHLORIDE 25 MG/1
25 TABLET ORAL
Qty: 21 TABLET | Refills: 0 | Status: SHIPPED | OUTPATIENT
Start: 2022-06-10 | End: 2022-06-20

## 2022-06-10 RX ADMIN — MECLIZINE 50 MG: 12.5 TABLET ORAL at 20:00

## 2022-06-10 RX ADMIN — ONDANSETRON HYDROCHLORIDE 4 MG: 2 SOLUTION INTRAMUSCULAR; INTRAVENOUS at 20:00

## 2022-06-10 NOTE — ED PROVIDER NOTES
Date of Service:  6/10/2022    Patient:  Jas Gale    Chief Complaint:  Vomiting and Dizziness       HPI:  Jas Gale is a 78 y.o.  male who presents for evaluation of vomiting and dizziness. Patient states that he was at a  today. At some point this afternoon he stood up and started feeling dizzy. Described as the room spinning with associated nausea and vomiting. History of vertigo in the past that presented very similarly to this. Patient has no lightheadedness. He denies any type of chest pain or shortness of breath. He otherwise denies any other acute complaints or modifying factors           Past Medical History:   Diagnosis Date    Cancer ([facility]     skin    Carotid artery disease ([facility]     Carotid stenosis, bilateral     Hollenhorst plaque, left eye 2012    Hyperthyroidism     Paroxysmal atrial fibrillation (HCC)     Syncope        Past Surgical History:   Procedure Laterality Date    ECHO 2D ADULT  2011    EF 60%, Trace ALVARO BRIDGES 41    HX MALIGNANT SKIN LESION EXCISION  2021    bilaterally arms    NH CARDIAC SURG PROCEDURE UNLIST      ablation for  fib         Family History:   Problem Relation Age of Onset    No Known Problems Mother     No Known Problems Father        Social History     Socioeconomic History    Marital status:      Spouse name: Not on file    Number of children: Not on file    Years of education: Not on file    Highest education level: Not on file   Occupational History    Not on file   Tobacco Use    Smoking status: Former Smoker     Types: Cigarettes     Quit date: 1970     Years since quittin.0    Smokeless tobacco: Never Used   Vaping Use    Vaping Use: Never used   Substance and Sexual Activity    Alcohol use:  Yes     Alcohol/week: 1.0 standard drink     Types: 1 Glasses of wine per week     Comment: every day- 1 cocktail and glass of wine with dinner    Drug use: Never    Sexual activity: Not on file   Other Topics Concern    Not on file   Social History Narrative    Not on file     Social Determinants of Health     Financial Resource Strain:     Difficulty of Paying Living Expenses: Not on file   Food Insecurity:     Worried About Running Out of Food in the Last Year: Not on file    Maritza of Food in the Last Year: Not on file   Transportation Needs:     Lack of Transportation (Medical): Not on file    Lack of Transportation (Non-Medical): Not on file   Physical Activity:     Days of Exercise per Week: Not on file    Minutes of Exercise per Session: Not on file   Stress:     Feeling of Stress : Not on file   Social Connections:     Frequency of Communication with Friends and Family: Not on file    Frequency of Social Gatherings with Friends and Family: Not on file    Attends Confucianism Services: Not on file    Active Member of [address] [place] [address] [employer] or Organizations: Not on file    Attends Club or Organization Meetings: Not on file    Marital Status: Not on file   Intimate Partner Violence:     Fear of Current or Ex-Partner: Not on file    Emotionally Abused: Not on file    Physically Abused: Not on file    Sexually Abused: Not on file   Housing Stability:     Unable to Pay for Housing in the Last Year: Not on file    Number of [place] in the Last Year: Not on file    Unstable Housing in the Last Year: Not on file         ALLERGIES: Patient has no known allergies. Review of Systems   Gastrointestinal: Positive for nausea and vomiting. Neurological: Positive for dizziness. Negative for light-headedness and headaches. All other systems reviewed and are negative. Vitals:    06/10/22 1934 06/10/22 1946   BP: (!) 154/79    Pulse: 71    Resp: 16    Temp: 98.1 °F (36.7 °C)    SpO2: 96% 98%   Weight: 74.8 kg (164 lb 14.5 oz)    Height: 6' (1.829 m)             Physical Exam  Vitals and nursing note reviewed. Constitutional:       Appearance: Normal appearance.    HENT:      Head: Normocephalic and atraumatic. Nose: Nose normal. No congestion. Mouth/Throat:      Mouth: Mucous membranes are moist.   Eyes:      General: No scleral icterus. Right eye: No discharge. Left eye: No discharge. Extraocular Movements: Extraocular movements intact. Pupils: Pupils are equal, round, and reactive to light. Cardiovascular:      Rate and Rhythm: Normal rate and regular rhythm. Pulses: Normal pulses. Pulmonary:      Effort: Pulmonary effort is normal.      Breath sounds: Normal breath sounds. No wheezing. Abdominal:      General: Abdomen is flat. Musculoskeletal:         General: No deformity. Cervical back: Normal range of motion. Skin:     General: Skin is warm. Capillary Refill: Capillary refill takes less than 2 seconds. Neurological:      Mental Status: He is alert and oriented to person, place, and time. Sensory: No sensory deficit. Motor: No weakness. Gait: Gait normal.   Psychiatric:         Mood and Affect: Mood normal.          [facility]  ED Course as of 06/10/22 1950   Fri Neto 10, 2022   1949 EKG 1936  Normal sinus rhythm at 70 bpm with normal axis and intervals.   No STEMI [GG]      ED Course User Index  [GG] Macy Lares DO     VITAL SIGNS:  Patient Vitals for the past 4 hrs:   Temp Pulse Resp BP SpO2   06/10/22 1946 -- -- -- -- 98 %   06/10/22 1934 98.1 °F (36.7 °C) 71 16 (!) 154/79 96 %         LABS:  Recent Results (from the past 6 hour(s))   CBC WITH AUTOMATED DIFF    Collection Time: 06/10/22  7:42 PM   Result Value Ref Range    WBC 7.6 4.1 - 11.1 K/uL    RBC 3.86 (L) 4.10 - 5.70 M/uL    HGB 12.5 12.1 - 17.0 g/dL    HCT 36.9 36.6 - 50.3 %    MCV 95.6 80.0 - 99.0 FL    MCH 32.4 26.0 - 34.0 PG    MCHC 33.9 30.0 - 36.5 g/dL    RDW 12.2 11.5 - 14.5 %    PLATELET 349 346 - 466 K/uL    MPV 10.0 8.9 - 12.9 FL    NRBC 0.0 0  WBC    ABSOLUTE NRBC 0.00 0.00 - 0.01 K/uL    NEUTROPHILS 50 32 - 75 %    LYMPHOCYTES 36 12 - 49 %    MONOCYTES 9 5 - 13 %    EOSINOPHILS 4 0 - 7 %    BASOPHILS 1 0 - 1 %    IMMATURE GRANULOCYTES 0 0.0 - 0.5 %    ABS. NEUTROPHILS 3.9 1.8 - 8.0 K/UL    ABS. LYMPHOCYTES 2.8 0.8 - 3.5 K/UL    ABS. MONOCYTES 0.7 0.0 - 1.0 K/UL    ABS. EOSINOPHILS 0.3 0.0 - 0.4 K/UL    ABS. BASOPHILS 0.0 0.0 - 0.1 K/UL    ABS. IMM. GRANS. 0.0 0.00 - 0.04 K/UL    DF AUTOMATED     METABOLIC PANEL, COMPREHENSIVE    Collection Time: 06/10/22  7:42 PM   Result Value Ref Range    Sodium 140 136 - 145 mmol/L    Potassium 3.7 3.5 - 5.1 mmol/L    Chloride 102 97 - 108 mmol/L    CO2 28 21 - 32 mmol/L    Anion gap 10 5 - 15 mmol/L    Glucose 108 (H) 65 - 100 mg/dL    BUN 15 6 - 20 MG/DL    Creatinine 0.91 0.70 - 1.30 MG/DL    BUN/Creatinine ratio 16 12 - 20      GFR est AA >60 >60 ml/min/1.73m2    GFR est non-AA >60 >60 ml/min/1.73m2    Calcium 8.9 8.5 - 10.1 MG/DL    Bilirubin, total 1.1 (H) 0.2 - 1.0 MG/DL    ALT (SGPT) 24 12 - 78 U/L    AST (SGOT) 16 15 - 37 U/L    Alk. phosphatase 63 45 - 117 U/L    Protein, total 7.1 6.4 - 8.2 g/dL    Albumin 4.0 3.5 - 5.0 g/dL    Globulin 3.1 2.0 - 4.0 g/dL    A-G Ratio 1.3 1.1 - 2.2     TROPONIN-HIGH SENSITIVITY    Collection Time: 06/10/22  7:42 PM   Result Value Ref Range    Troponin-High Sensitivity 7 0 - 76 ng/L        IMAGING:  No orders to display         Medications During Visit:  Medications   meclizine (ANTIVERT) tablet 50 mg (50 mg Oral Given 6/10/22 2000)   ondansetron (ZOFRAN) injection 4 mg (4 mg IntraVENous Given 6/10/22 2000)         DECISION MAKING:  Yareli Talamantes is a 78 y.o. male who comes in as above. Well-appearing patient here. Negative HI NTS exam.  Patient has resolution of all symptoms while here. Unremarkable work-up will discharge home with diagnosis of probable vertigo given his history of the like      IMPRESSION:  1. Vertigo    2.  Dizzy spells        DISPOSITION:  Discharged      Current Discharge Medication List      START taking these medications    Details   meclizine (ANTIVERT) 25 mg tablet Take 1 Tablet by mouth three (3) times daily as needed for Dizziness for up to 10 days. Qty: 21 Tablet, Refills: 0  Start date: 6/10/2022, End date: 6/20/2022      ondansetron (ZOFRAN ODT) 4 mg disintegrating tablet Take 1 Tablet by mouth every eight (8) hours as needed for Nausea for up to 12 doses. Qty: 12 Tablet, Refills: 0  Start date: 6/10/2022              Follow-up Information     Follow up With Specialties Details Why Contact Donal De La Vega MD Internal Medicine Physician Schedule an appointment as soon as possible for a visit   [address] [facility]  [phone]              The patient is asked to follow-up with their primary care provider in the next several days. They are to call tomorrow for an appointment. The patient is asked to return promptly for any increased concerns or worsening of symptoms. They can return to this emergency department or any other emergency department.     Procedures

## 2022-06-10 NOTE — ED TRIAGE NOTES
Patient arrived to ED via EMS from home. Patient reports dizziness ( describes as room is spining), nausea with vomiting, lightheaded. Reports the episode started at 1800. States had the same episode 3 months ago. Hx of Afib .

## 2022-06-10 NOTE — ED NOTES
Bedside and Verbal shift change report given to Ubaldo Thomas RN (oncoming nurse) by Tom Montiel RN (offgoing nurse). Report included the following information SBAR, ED Summary and Intake/Output.

## 2022-06-11 LAB
ATRIAL RATE: 70 BPM
CALCULATED P AXIS, ECG09: 97 DEGREES
CALCULATED R AXIS, ECG10: 65 DEGREES
CALCULATED T AXIS, ECG11: 61 DEGREES
DIAGNOSIS, 93000: NORMAL
P-R INTERVAL, ECG05: 138 MS
Q-T INTERVAL, ECG07: 416 MS
QRS DURATION, ECG06: 108 MS
QTC CALCULATION (BEZET), ECG08: 449 MS
VENTRICULAR RATE, ECG03: 70 BPM

## 2023-05-19 RX ORDER — ASPIRIN 81 MG/1
TABLET ORAL DAILY
COMMUNITY

## 2023-05-19 RX ORDER — METHIMAZOLE 5 MG/1
2.5 TABLET ORAL
COMMUNITY
Start: 2019-02-11

## 2023-05-19 RX ORDER — FLUOROURACIL 50 MG/G
CREAM TOPICAL
COMMUNITY
Start: 2021-03-04

## 2023-05-19 RX ORDER — IBUPROFEN 800 MG/1
800 TABLET ORAL EVERY 6 HOURS PRN
COMMUNITY
Start: 2022-01-25

## 2023-05-19 RX ORDER — ONDANSETRON 4 MG/1
4 TABLET, ORALLY DISINTEGRATING ORAL EVERY 8 HOURS PRN
COMMUNITY
Start: 2022-06-10

## 2023-05-19 RX ORDER — FLUTICASONE PROPIONATE 50 MCG
SPRAY, SUSPENSION (ML) NASAL PRN
COMMUNITY

## 2025-01-27 ENCOUNTER — TRANSCRIBE ORDERS (OUTPATIENT)
Facility: HOSPITAL | Age: 80
End: 2025-01-27

## 2025-01-27 ENCOUNTER — HOSPITAL ENCOUNTER (OUTPATIENT)
Facility: HOSPITAL | Age: 80
Discharge: HOME | End: 2025-01-30
Payer: MEDICARE

## 2025-01-27 DIAGNOSIS — M54.50 LOW BACK PAIN, UNSPECIFIED BACK PAIN LATERALITY, UNSPECIFIED CHRONICITY, UNSPECIFIED WHETHER SCIATICA PRESENT: Primary | ICD-10-CM

## 2025-01-27 DIAGNOSIS — M54.50 LOW BACK PAIN, UNSPECIFIED BACK PAIN LATERALITY, UNSPECIFIED CHRONICITY, UNSPECIFIED WHETHER SCIATICA PRESENT: ICD-10-CM

## 2025-01-27 PROCEDURE — 72110 X-RAY EXAM L-2 SPINE 4/>VWS: CPT

## (undated) DEVICE — INTENDED FOR TISSUE SEPARATION, AND OTHER PROCEDURES THAT REQUIRE A SHARP SURGICAL BLADE TO PUNCTURE OR CUT.: Brand: BARD-PARKER SAFETY BLADES SIZE 11, STERILE

## (undated) DEVICE — NEEDLE HYPO 18GA L1.5IN PNK POLYPR HUB S STL THN WALL FILL

## (undated) DEVICE — TUBING, SUCTION, 1/4" X 10', STRAIGHT: Brand: MEDLINE

## (undated) DEVICE — STERILE POLYISOPRENE POWDER-FREE SURGICAL GLOVES: Brand: PROTEXIS

## (undated) DEVICE — SKIN MARKER,REGULAR TIP WITH RULER: Brand: DEVON

## (undated) DEVICE — 3L THIN WALL CAN: Brand: CRD

## (undated) DEVICE — [ARTHROSCOPY PUMP,  DO NOT USE IF PACKAGE IS DAMAGED,  KEEP DRY,  KEEP AWAY FROM SUNLIGHT,  PROTECT FROM HEAT AND RADIOACTIVE SOURCES.]: Brand: FLOSTEADY

## (undated) DEVICE — GAUZE,SPONGE,4"X4",16PLY,STRL,LF,10/TRAY: Brand: MEDLINE

## (undated) DEVICE — SUTURE ETHLN SZ 3-0 L18IN NONABSORBABLE BLK FS-1 L24MM 3/8 663H

## (undated) DEVICE — BLADE SHV 4.0MM TOMCAT --

## (undated) DEVICE — GOWN,REINFORCED,POLY,AURORA,XLARGE,STRL: Brand: MEDLINE

## (undated) DEVICE — NEEDLE HYPO 21GA L1.5IN GRN POLYPR HUB S STL THN WALL IV

## (undated) DEVICE — SOLUTION IRRIG 3000ML LAC RINGERS ARTHROMTC PLAS CONT

## (undated) DEVICE — BANDAGE COMPR W6INXL5YD NONSTERILE TAN BRTH SELF ADH WRP W/

## (undated) DEVICE — ZIMMER® STERILE DISPOSABLE TOURNIQUET CUFF WITH PROTECTIVE SLEEVE AND PLC, DUAL PORT, SINGLE BLADDER, 34 IN. (86 CM)

## (undated) DEVICE — APPLICATOR SCRB 26ML TEAL STRL -- CHLORAPREP 26ML

## (undated) DEVICE — SOCK SPEC SHT 4.5 IN UNIV CANSTR COMPATIBILITY

## (undated) DEVICE — BLADE ASSEMB CLP HAIR FINE --

## (undated) DEVICE — NDL SPNE QNCKE 18GX3.5IN LF --

## (undated) DEVICE — X-RAY DETECTABLE SPONGES,16 PLY: Brand: VISTEC

## (undated) DEVICE — PADDING CAST W6INXL4YD RAYON UNDERCAST SOF-ROL

## (undated) DEVICE — SYR LR LCK 1ML GRAD NSAF 30ML --

## (undated) DEVICE — 1230 DOUBLE MAGNET NEEDLE COUNTER,BLACK: Brand: DEVON

## (undated) DEVICE — WRAP COMPR KNEE PREM W GEL BG

## (undated) DEVICE — BLANKET WRM AD PREM MISTRAL-AIR

## (undated) DEVICE — LABEL STERILIZATION W/PEN -- 50/CA

## (undated) DEVICE — PACK,ARTHROSCOPY I,SIRUS: Brand: MEDLINE

## (undated) DEVICE — Device

## (undated) DEVICE — INFECTION CONTROL KIT SYS